# Patient Record
Sex: FEMALE | Race: WHITE | URBAN - METROPOLITAN AREA
[De-identification: names, ages, dates, MRNs, and addresses within clinical notes are randomized per-mention and may not be internally consistent; named-entity substitution may affect disease eponyms.]

---

## 2017-05-30 ENCOUNTER — TRANSFERRED RECORDS (OUTPATIENT)
Dept: HEALTH INFORMATION MANAGEMENT | Facility: CLINIC | Age: 82
End: 2017-05-30

## 2017-06-11 ENCOUNTER — TRANSFERRED RECORDS (OUTPATIENT)
Dept: HEALTH INFORMATION MANAGEMENT | Facility: CLINIC | Age: 82
End: 2017-06-11

## 2017-06-20 ENCOUNTER — TRANSFERRED RECORDS (OUTPATIENT)
Dept: HEALTH INFORMATION MANAGEMENT | Facility: CLINIC | Age: 82
End: 2017-06-20

## 2017-06-20 ENCOUNTER — MEDICAL CORRESPONDENCE (OUTPATIENT)
Dept: HEALTH INFORMATION MANAGEMENT | Facility: CLINIC | Age: 82
End: 2017-06-20

## 2017-06-27 PROCEDURE — 00000346 ZZHCL STATISTIC REVIEW OUTSIDE SLIDES TC 88321: Performed by: OBSTETRICS & GYNECOLOGY

## 2017-06-28 ENCOUNTER — ONCOLOGY VISIT (OUTPATIENT)
Dept: ONCOLOGY | Facility: CLINIC | Age: 82
End: 2017-06-28
Attending: OBSTETRICS & GYNECOLOGY
Payer: COMMERCIAL

## 2017-06-28 VITALS
HEART RATE: 107 BPM | RESPIRATION RATE: 12 BRPM | SYSTOLIC BLOOD PRESSURE: 135 MMHG | HEIGHT: 66 IN | TEMPERATURE: 98.3 F | OXYGEN SATURATION: 94 % | BODY MASS INDEX: 28.01 KG/M2 | DIASTOLIC BLOOD PRESSURE: 71 MMHG | WEIGHT: 174.3 LBS

## 2017-06-28 DIAGNOSIS — Z85.43 PERSONAL HISTORY OF OVARIAN CANCER: Primary | ICD-10-CM

## 2017-06-28 PROCEDURE — 99205 OFFICE O/P NEW HI 60 MIN: CPT | Mod: ZP | Performed by: OBSTETRICS & GYNECOLOGY

## 2017-06-28 PROCEDURE — 99212 OFFICE O/P EST SF 10 MIN: CPT | Mod: ZF

## 2017-06-28 RX ORDER — MULTIPLE VITAMINS W/ MINERALS TAB 9MG-400MCG
1 TAB ORAL EVERY MORNING
COMMUNITY
Start: 2011-11-10

## 2017-06-28 RX ORDER — CHOLECALCIFEROL (VITAMIN D3) 50 MCG
2000 TABLET ORAL EVERY MORNING
COMMUNITY
Start: 2015-08-12

## 2017-06-28 RX ORDER — SPIRONOLACTONE 100 MG/1
100 TABLET, FILM COATED ORAL
COMMUNITY
Start: 2017-06-11 | End: 2017-09-18

## 2017-06-28 RX ORDER — ACETAMINOPHEN 325 MG/1
650 TABLET ORAL PRN
COMMUNITY
Start: 2011-11-10

## 2017-06-28 RX ORDER — PRAVASTATIN SODIUM 40 MG
40 TABLET ORAL AT BEDTIME
COMMUNITY
Start: 2017-01-25

## 2017-06-28 RX ORDER — FUROSEMIDE 40 MG
20 TABLET ORAL EVERY MORNING
COMMUNITY
Start: 2017-06-11

## 2017-06-28 RX ORDER — AMOXICILLIN 500 MG
1200 CAPSULE ORAL EVERY MORNING
COMMUNITY
Start: 2011-11-14

## 2017-06-28 ASSESSMENT — PAIN SCALES - GENERAL: PAINLEVEL: NO PAIN (0)

## 2017-06-28 NOTE — LETTER
2017       RE: Kymberly Miller  [address] [address]     Dear Colleague,    Thank you for referring your patient, Kymberly Miller, to the [facility] CANCER CLINIC. Please see a copy of my visit note below.                            Consult Notes on Referred Patient    Date: 2017       Dr. Lily Trejo  [facility]   [address] [place] [address]       RE: Kymberly Miller  : 1933  SEBASTIAN: 2017    Dear Dr. Lily Trejo:    I had the pleasure of seeing your patient Kymberly Miller here at the Gynecologic Cancer Clinic at the [facility] on 2017.  As you know she is a very pleasant 83 year old woman with a recent diagnosis of adenocarcinma of mullerian origin.  Given these findings she was subsequently sent to the Gynecologic Cancer Clinic for new patient consultation.   Patient had some bloating and was found to have ascites with positive cytology. She is  , never has been on HRT. She has been started on neoadjuvant chemotherapy based on ERIN 7 protocol with Dr. Trejo.    Past Medical History:    Aortic stenosis  CHF  CAD      Past Surgical History:    Cholecystectomy      Health Maintenance:  Health Maintenance Due   Topic Date Due     TETANUS IMMUNIZATION (SYSTEM ASSIGNED)  1951     ADVANCE DIRECTIVE PLANNING Q5 YRS  1951     FALL RISK ASSESSMENT  1998     DEXA SCAN SCREENING (SYSTEM ASSIGNED)  1998     PNEUMOCOCCAL (1 of 2 - PCV13) 1998       No history of abnormal pap smears.      Current Medications:     has a current medication list which includes the following prescription(s): acetaminophen, aspirin, vitamin d, furosemide, multivitamin, therapeutic with minerals, fish oil, pravastatin, spironolactone, and prochlorperazine maleate.       Allergies:     [unfilled]        Social History:     Social History   Substance Use Topics     Smoking status: Never Smoker     Smokeless tobacco: Not on  "file     Alcohol use Not on file       History   Drug Use Not on file           Family History:     Sister with breast cancer in her 50s  Mother with breast cancer in her 70s    Physical Exam:     /71  Pulse 107  Temp 98.3  F (36.8  C) (Oral)  Resp 12  Ht 1.672 m (5' 5.83\")  Wt 79.1 kg (174 lb 4.8 oz)  SpO2 94%  BMI 28.28 kg/m2  Body mass index is 28.28 kg/(m^2).    General Appearance: healthy and alert, no distress     Gastrointestinal:       abdomen soft, distended, non-distended, no organomegaly or masses    Genitourinary: External genitalia and urethral meatus appears normal.  Vagina is smooth without nodularity or masses.  Cervix appears normal and without lesions.  Bimanual exam reveal no masses, nodularity or fullness.  Recto-vaginal exam confirms these findings.      Assessment:    Kymberly Miller is a 83 year old woman with a new diagnosis of adenocarcinma of mullerian origin.     A total of 60 minutes was spent with the patient, 40 minutes of which were spent in counseling the patient and/or treatment planning.    1. Metastatic adenocarcinma of mullerian origin  2. CHF  3. CAD  4. Neoadjuvant chemotherapy  5.  451  6. Albumin 2.5    I had a long discussion with the patient and her family, given her overall health advanced adenocarcinma of mullerian origin, she will most likely be stage SHAY given pleural effusion. She has been started on ERIN 7 protocol with Carboplatin Taxol weekly. She will do three cycles and we will see her back after that with a  and repeat CT abdomen and pelvis.      Thank you for allowing us to participate in the care of your patient.         Sincerely,    José Cole MD, MS    Department of Obstetrics and Gynecology   Division of Gynecologic Oncology   [facility]  Phone: [phone]      CC  Patient Care Team:  Terrell Barry as PCP - General (Family Practice)  Lily Trejo as Referring Physician (Hematology & " Oncology)

## 2017-06-28 NOTE — PROGRESS NOTES
Consult Notes on Referred Patient    Date: 2017       Dr. Lily Trejo  [facility]   [address] [place] [address]       RE: Kymberly Miller  : 1933  SEBASTIAN: 2017    Dear Dr. Lily Trejo:    I had the pleasure of seeing your patient Kymberly Miller here at the Gynecologic Cancer Clinic at the [facility] on 2017.  As you know she is a very pleasant 83 year old woman with a recent diagnosis of adenocarcinma of mullerian origin.  Given these findings she was subsequently sent to the Gynecologic Cancer Clinic for new patient consultation.   Patient had some bloating and was found to have ascites with positive cytology. She is  , never has been on HRT. She has been started on neoadjuvant chemotherapy based on ERIN 7 protocol with Dr. Trejo.    Past Medical History:    Aortic stenosis  CHF  CAD      Past Surgical History:    Cholecystectomy      Health Maintenance:  Health Maintenance Due   Topic Date Due     TETANUS IMMUNIZATION (SYSTEM ASSIGNED)  1951     ADVANCE DIRECTIVE PLANNING Q5 YRS  1951     FALL RISK ASSESSMENT  1998     DEXA SCAN SCREENING (SYSTEM ASSIGNED)  1998     PNEUMOCOCCAL (1 of 2 - PCV13) 1998       No history of abnormal pap smears.      Current Medications:     has a current medication list which includes the following prescription(s): acetaminophen, aspirin, vitamin d, furosemide, multivitamin, therapeutic with minerals, fish oil, pravastatin, spironolactone, and prochlorperazine maleate.       Allergies:     [unfilled]        Social History:     Social History   Substance Use Topics     Smoking status: Never Smoker     Smokeless tobacco: Not on file     Alcohol use Not on file       History   Drug Use Not on file           Family History:     Sister with breast cancer in her 50s  Mother with breast cancer in her 70s    Physical Exam:     /71  Pulse 107  Temp 98.3  " F (36.8  C) (Oral)  Resp 12  Ht 1.672 m (5' 5.83\")  Wt 79.1 kg (174 lb 4.8 oz)  SpO2 94%  BMI 28.28 kg/m2  Body mass index is 28.28 kg/(m^2).    General Appearance: healthy and alert, no distress     Gastrointestinal:       abdomen soft, distended, non-distended, no organomegaly or masses    Genitourinary: External genitalia and urethral meatus appears normal.  Vagina is smooth without nodularity or masses.  Cervix appears normal and without lesions.  Bimanual exam reveal no masses, nodularity or fullness.  Recto-vaginal exam confirms these findings.      Assessment:    Kymberly Miller is a 83 year old woman with a new diagnosis of adenocarcinma of mullerian origin.     A total of 60 minutes was spent with the patient, 40 minutes of which were spent in counseling the patient and/or treatment planning.    1. Metastatic adenocarcinma of mullerian origin  2. CHF  3. CAD  4. Neoadjuvant chemotherapy  5.  451  6. Albumin 2.5    I had a long discussion with the patient and her family, given her overall health advanced adenocarcinma of mullerian origin, she will most likely be stage SHAY given pleural effusion. She has been started on ERIN 7 protocol with Carboplatin Taxol weekly. She will do three cycles and we will see her back after that with a  and repeat CT abdomen and pelvis.      Thank you for allowing us to participate in the care of your patient.         Sincerely,    José Cole MD, MS    Department of Obstetrics and Gynecology   Division of Gynecologic Oncology   [facility]  Phone: [phone]      CC  Patient Care Team:  Terrell Barry as PCP - General (Family Practice)  Carlyn Trejo as Referring Physician (Hematology & Oncology)  CARLYN TREJO    "

## 2017-06-28 NOTE — MR AVS SNAPSHOT
After Visit Summary   6/28/2017    Kymberly Miller    MRN: [id number]           Patient Information     Date Of Birth          9/30/1933        Visit Information        Provider Department      6/28/2017 4:00 PM José Cole MD [facility]        Today's Diagnoses     Personal history of ovarian cancer    -  1       Follow-ups after your visit        Your next 10 appointments already scheduled     Sep 18, 2017  7:00 AM CDT   (Arrive by 6:45 AM)   CT CHEST ABDOMEN PELVIS W/O & W CONTRAST with UCCT1   [facility] [place] [facility] ([facility])    [address] [address] [phone]           Please bring any scans or X-rays taken at other hospitals, if similar tests were done. Also bring a list of your medicines, including vitamins, minerals and over-the-counter drugs. It is safest to leave personal items at home.  Be sure to tell your doctor:   If you have any allergies.   If there s any chance you are pregnant.   If you are breastfeeding.   If you have any special needs.  You may have contrast for this exam. To prepare:   Do not eat or drink for 2 hours before your exam. If you need to take medicine, you may take it with small sips of water. (We may ask you to take liquid medicine as well.)   The day before your exam, drink extra fluids at least six 8-ounce glasses (unless your doctor tells you to restrict your fluids).  Patients over 70 or patients with diabetes or kidney problems:   If you haven t had a blood test (creatinine test) within the last 30 days, go to your clinic or Diagnostic Imaging Department for this test.  If you have diabetes:   If your kidney function is normal, continue taking your metformin (Avandamet, Glucophage, Glucovance, Metaglip) on the day of your exam.   If your kidney function is abnormal, wait 48 hours before restarting this medicine.  You will have oral contrast for this exam:   You will  drink the contrast at home. Get this from your clinic or Diagnostic Imaging Department. Please follow the directions given.  Please wear loose clothing, such as a sweat suit or jogging clothes. Avoid snaps, zippers and other metal. We may ask you to undress and put on a hospital gown.  If you have any questions, please call the Imaging Department where you will have your exam.            Sep 18, 2017  7:30 AM CDT   [facility] Lab Draw with  [facility] LAB DRAW   [facility] ([facility])    [address] [place] [id number]   [phone]            Sep 18, 2017  2:40 PM CDT   (Arrive by 2:25 PM)   Return Visit with José Cole MD   [facility] ([facility])    [address] [place] [address]   [phone]            Sep 18, 2017  4:00 PM CDT   (Arrive by 3:45 PM)   PAC EVALUATION with  Pac Catia [address] [place] [address] Preoperative Assessment [place] ([facility])    [address] [place] [id number]   [phone]            Sep 18, 2017  4:50 PM CDT   (Arrive by 4:35 PM)   PAC Anesthesia Consult with  Pac Anesthesiologist   [facility] Preoperative Assessment [place] ([facility])    [address] [place] [address]   [phone]            Sep 18, 2017  5:00 PM CDT   (Arrive by 4:45 PM)   PAC RN ASSESSMENT with  Pac Rn   [facility] Preoperative Assessment [place] ([facility])    [address] [place] [id number]   [phone]              Future tests that were ordered for you today     Open Future Orders        Priority Expected Expires Ordered     Routine  8/17/2018 8/17/2017    CT Chest Abdomen Pelvis w/o & w Contrast Routine  8/17/2018 8/17/2017            Who to contact     If you have questions or need follow up information about  "today's clinic visit or your schedule please contact [facility] CANCER [facility] directly at [phone].  Normal or non-critical lab and imaging results will be communicated to you by [employer], letter or phone within 4 business days after the clinic has received the results. If you do not hear from us within 7 days, please contact the clinic through [employer] or phone. If you have a critical or abnormal lab result, we will notify you by phone as soon as possible.  Submit refill requests through [employer] or call your pharmacy and they will forward the refill request to us. Please allow 3 business days for your refill to be completed.          Additional Information About Your Visit        [employer] Information     [employer] lets you send messages to your doctor, view your test results, renew your prescriptions, schedule appointments and more. To sign up, go to [email or web address] . Click on \"Log in\" on the left side of the screen, which will take you to the Welcome page. Then click on \"Sign up Now\" on the right side of the page.     You will be asked to enter the access code listed below, as well as some personal information. Please follow the directions to create your username and password.     Your access code is: [email or web address]  Expires: 2017  7:19 AM     Your access code will  in 90 days. If you need help or a new code, please call your [facility] clinic or [phone].        Care EveryWhere ID     This is your Care EveryWhere ID. This could be used by other organizations to access your [facility] medical records  [id number]        Your Vitals Were     Pulse Temperature Respirations Height Pulse Oximetry BMI (Body Mass Index)    107 98.3  F (36.8  C) (Oral) 12 1.672 m (5' 5.83\") 94% 28.28 kg/m2       Blood Pressure from Last 3 Encounters:   17 135/71    Weight from Last 3 Encounters:   17 79.1 kg (174 lb 4.8 oz)              Today, you had the following     No orders found for display       " Primary Care Provider Office Phone # Fax #    Terrell Barry [phone]       [facility] [address]  [facility] [address]        Equal Access to Services     MARCY MUSA : Hadii aad ku hadphilipibsmark Anneliseroya, wadavidda luqadaha, qaybta kaalmada kaushik, peter osirisin hayaan peramanda billmargretraji lea. So [facility] [phone].    ATENCIÓN: Si habla español, tiene a lozano disposición servicios gratuitos de asistencia lingüística. Llame al [phone].    We comply with applicable federal civil rights laws and [place] laws. We do not discriminate on the basis of race, color, national origin, age, disability sex, sexual orientation or gender identity.            Thank you!     Thank you for choosing [facility] CANCER [facility]  for your care. Our goal is always to provide you with excellent care. Hearing back from our patients is one way we can continue to improve our services. Please take a few minutes to complete the written survey that you may receive in the mail after your visit with us. Thank you!             Your Updated Medication List - Protect others around you: Learn how to safely use, store and throw away your medicines at www.disposemymeds.org.          This list is accurate as of: 6/28/17 11:59 PM.  Always use your most recent med list.                   Brand Name Dispense Instructions for use Diagnosis    acetaminophen 325 MG tablet    TYLENOL     Take 650 mg by mouth        aspirin 81 MG EC tablet      Take 81 mg by mouth        Fish Oil 1200 MG Caps      Take 1,200 mg by mouth        furosemide 40 MG tablet    LASIX     Take 40 mg by mouth        Multi-vitamin Tabs tablet           pravastatin 40 MG tablet    PRAVACHOL     Take 40 mg by mouth        PROCHLORPERAZINE MALEATE PO      Take 10 mg by mouth        spironolactone 100 MG tablet    ALDACTONE     Take 100 mg by mouth        vitamin D 2000 UNITS tablet      Take 2,000 Units by mouth

## 2017-06-28 NOTE — NURSING NOTE
"Oncology Rooming Note    June 28, 2017 3:26 PM   Kymberly Miller is a 83 year old female who presents for:    Chief Complaint   Patient presents with     Oncology Clinic Visit     ovarian ca      Initial Vitals: /71  Pulse 107  Temp 98.3  F (36.8  C) (Oral)  Resp 12  Ht 1.672 m (5' 5.83\")  Wt 79.1 kg (174 lb 4.8 oz)  SpO2 94%  BMI 28.28 kg/m2 Estimated body mass index is 28.28 kg/(m^2) as calculated from the following:    Height as of this encounter: 1.672 m (5' 5.83\").    Weight as of this encounter: 79.1 kg (174 lb 4.8 oz). Body surface area is 1.92 meters squared.  No Pain (0) Comment: Data Unavailable   No LMP recorded. Patient is not currently having periods (Reason: UNKNOWN).  Allergies reviewed: Yes  Medications reviewed: Yes    Medications: Medication refills not needed today.  Pharmacy name entered into EPIC: Data Unavailable    Clinical concerns: no doc was NOT notified.    5 minutes for nursing intake (face to face time)     Yusef Leong CMA              "

## 2017-06-30 LAB — COPATH REPORT: NORMAL

## 2017-08-17 DIAGNOSIS — Z85.43 PERSONAL HISTORY OF OVARIAN CANCER: Primary | ICD-10-CM

## 2017-08-17 DIAGNOSIS — C56.9 MALIGNANT NEOPLASM OF OVARY (H): Primary | ICD-10-CM

## 2017-09-15 ENCOUNTER — TRANSFERRED RECORDS (OUTPATIENT)
Dept: HEALTH INFORMATION MANAGEMENT | Facility: CLINIC | Age: 82
End: 2017-09-15

## 2017-09-18 ENCOUNTER — ONCOLOGY VISIT (OUTPATIENT)
Dept: ONCOLOGY | Facility: CLINIC | Age: 82
End: 2017-09-18
Attending: OBSTETRICS & GYNECOLOGY
Payer: MEDICARE

## 2017-09-18 ENCOUNTER — APPOINTMENT (OUTPATIENT)
Dept: LAB | Facility: CLINIC | Age: 82
End: 2017-09-18
Attending: OBSTETRICS & GYNECOLOGY
Payer: MEDICARE

## 2017-09-18 VITALS
SYSTOLIC BLOOD PRESSURE: 134 MMHG | BODY MASS INDEX: 26.89 KG/M2 | DIASTOLIC BLOOD PRESSURE: 87 MMHG | HEIGHT: 66 IN | WEIGHT: 167.3 LBS | TEMPERATURE: 98.2 F | OXYGEN SATURATION: 99 % | HEART RATE: 87 BPM | RESPIRATION RATE: 16 BRPM

## 2017-09-18 DIAGNOSIS — Z85.43 PERSONAL HISTORY OF OVARIAN CANCER: ICD-10-CM

## 2017-09-18 PROCEDURE — 99214 OFFICE O/P EST MOD 30 MIN: CPT | Mod: ZP | Performed by: OBSTETRICS & GYNECOLOGY

## 2017-09-18 PROCEDURE — 99212 OFFICE O/P EST SF 10 MIN: CPT | Mod: ZF

## 2017-09-18 PROCEDURE — 36591 DRAW BLOOD OFF VENOUS DEVICE: CPT

## 2017-09-18 PROCEDURE — 86304 IMMUNOASSAY TUMOR CA 125: CPT | Performed by: OBSTETRICS & GYNECOLOGY

## 2017-09-18 PROCEDURE — 25000128 H RX IP 250 OP 636: Mod: ZF | Performed by: OBSTETRICS & GYNECOLOGY

## 2017-09-18 RX ORDER — HEPARIN SODIUM (PORCINE) LOCK FLUSH IV SOLN 100 UNIT/ML 100 UNIT/ML
5 SOLUTION INTRAVENOUS DAILY PRN
Status: DISCONTINUED | OUTPATIENT
Start: 2017-09-18 | End: 2017-09-22 | Stop reason: HOSPADM

## 2017-09-18 RX ORDER — LIDOCAINE/PRILOCAINE 2.5 %-2.5%
CREAM (GRAM) TOPICAL PRN
COMMUNITY

## 2017-09-18 RX ADMIN — SODIUM CHLORIDE, PRESERVATIVE FREE 5 ML: 5 INJECTION INTRAVENOUS at 07:36

## 2017-09-18 ASSESSMENT — PAIN SCALES - GENERAL: PAINLEVEL: NO PAIN (0)

## 2017-09-18 NOTE — LETTER
2017       RE: Kymberly Miller  [address] [address]     Dear Colleague,    Thank you for referring your patient, Kymberly Miller, to the [facility] CANCER CLINIC. Please see a copy of my visit note below.                Follow Up Notes on Referred Patient    Date: 2017       Dr. Terrell Barry  [facility]  [address]  [place] [address]       RE: Kymberly Miller  : 1933  SEBASTIAN: 2017    Dear Dr. 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   The patient presents today here for followup.  She has received 3 cycles of ERIN-7 based treatment with weekly carboplatin and paclitaxel.  She has done well, normalized her CA-125.  She is eating and drinking better, minimal nausea, no vomiting.  No signs of infection, normal urinary and bowel function.           Past Medical History:    Past Medical History:   Diagnosis Date     Ascites      Chronic diastolic heart failure (H)      Hyperlipidemia      Mild aortic stenosis      Moderate mitral regurgitation      Nonobstructive atherosclerosis of coronary artery      Obesity      Ovarian cancer (H)      Pacemaker     [employer]ator Model # [id number] Serial # [id number] Implant date 2016 Indication 3RD DEGREE BLOCK     Third degree heart block (H)          Past Surgical History:    Past Surgical History:   Procedure Laterality Date     CATARACT IOL, RT/LT       CHOLECYSTECTOMY       HC LEFT HEART CATHETERIZATION       IMPLANT PACEMAKER  2016     OTHER SURGICAL HISTORY  2017    power port placement         Health Maintenance Due   Topic Date Due     TETANUS IMMUNIZATION (SYSTEM ASSIGNED)  1951     ADVANCE DIRECTIVE PLANNING Q5 YRS  1988     FALL RISK ASSESSMENT  1998     DEXA SCAN SCREENING (SYSTEM ASSIGNED)  1998     PNEUMOCOCCAL (1 of 2 - PCV13) 1998     INFLUENZA VACCINE (SYSTEM ASSIGNED)   "09/01/2017       Current Medications:     Current Outpatient Prescriptions   Medication Sig Dispense Refill     POTASSIUM CHLORIDE PO Take 10 mEq by mouth daily       lidocaine-prilocaine (EMLA) cream Apply topically as needed for moderate pain       aspirin 81 MG EC tablet Take 81 mg by mouth Takes three times a week       Cholecalciferol (VITAMIN D) 2000 UNITS tablet Take 2,000 Units by mouth daily        furosemide (LASIX) 40 MG tablet Take 40 mg by mouth daily        multivitamin, therapeutic with minerals (MULTI-VITAMIN) TABS tablet Take by mouth daily        Omega-3 Fatty Acids (FISH OIL) 1200 MG CAPS Take 1,200 mg by mouth daily        pravastatin (PRAVACHOL) 40 MG tablet Take 40 mg by mouth daily        iohexol (OMNIPAQUE) 140 MG/ML SOLN solution Mix entire bottle (50ml) of contast with 600ml (20 ounces) of water and drink half 2 hrs prior to CT scan and half 1 hr prior to scan (Patient not taking: Reported on 9/18/2017) 50 mL 0     acetaminophen (TYLENOL) 325 MG tablet Take 650 mg by mouth as needed        PROCHLORPERAZINE MALEATE PO Take 10 mg by mouth           Allergies:        Allergies   Allergen Reactions     Atorvastatin      Muscle pain         Social History:     Social History   Substance Use Topics     Smoking status: Never Smoker     Smokeless tobacco: Never Used     Alcohol use 1.2 - 1.8 oz/week     2 - 3 Cans of beer per week       History   Drug Use Not on file         Family History:       Family History   Problem Relation Age of Onset     CEREBROVASCULAR DISEASE Mother      HEART DISEASE Father      Chronic Obstructive Pulmonary Disease Sister      Esophageal Cancer Brother 41     CEREBROVASCULAR DISEASE Brother 80     Heart Failure Son      Pacemaker Son          Physical Exam:     /87  Pulse 87  Temp 98.2  F (36.8  C) (Oral)  Resp 16  Ht 1.672 m (5' 5.83\")  Wt 75.9 kg (167 lb 4.8 oz)  SpO2 99%  Breastfeeding? No  BMI 27.14 kg/m2  Body mass index is 27.14 kg/(m^2).    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     A total of 30 minutes was spent with the patient,  minutes of which were spent in counseling the patient and/or treatment planning.    1.  Stage IV high-grade serous ovarian carcinoma.   2.  Positive treatment response.   3.  Several 3 mm lung nodules.      I had a long discussion with the patient as well as with her family.  We plan to proceed with another 3 cycles of chemotherapy based on ERIN-7 protocol with carboplatin weekly of AUC of 2 as well as paclitaxel dose of 60 mg/m2 weekly.  We will see her back after that with another CT scan as well as a CA-125.  Depending on the lung nodules, if those have disappeared, we will proceed with surgery at that point.  If they are unchanged, they might not be disease related.  If she has further positive treatment response, we will then proceed with debulking surgery.  The patient as well as family agree with the plan, are very appreciative of her care.  All questions were answered.       José Cole MD, MS    Department of Obstetrics and Gynecology   Division of Gynecologic Oncology   [facility]  Phone: [phone]    CC  Patient Care Team:  Terrell Barry as PCP - General (Family Practice)  Lily Trejo as Referring Physician (Hematology & Oncology)      Patient presents today here for      Reviewed by MD. Patient will continue on chemotherapy.

## 2017-09-18 NOTE — MR AVS SNAPSHOT
After Visit Summary   9/18/2017    Kymberly Miller    MRN: [id number]           Patient Information     Date Of Birth          9/30/1933        Visit Information        Provider Department      9/18/2017 2:40 PM José Cole MD [facility]        Today's Diagnoses     Personal history of ovarian cancer           Follow-ups after your visit        Your next 10 appointments already scheduled     Dec 11, 2017  7:20 AM CST   (Arrive by 7:05 AM)   CT CHEST ABDOMEN PELVIS W/O & W CONTRAST with UCCT2   [facility] ([facility])    [address] [address] [phone]           Please bring any scans or X-rays taken at other hospitals, if similar tests were done. Also bring a list of your medicines, including vitamins, minerals and over-the-counter drugs. It is safest to leave personal items at home.  Be sure to tell your doctor:   If you have any allergies.   If there s any chance you are pregnant.   If you are breastfeeding.   If you have any special needs.  You may have contrast for this exam. To prepare:   Do not eat or drink for 2 hours before your exam. If you need to take medicine, you may take it with small sips of water. (We may ask you to take liquid medicine as well.)   The day before your exam, drink extra fluids at least six 8-ounce glasses (unless your doctor tells you to restrict your fluids).  Patients over 70 or patients with diabetes or kidney problems:   If you haven t had a blood test (creatinine test) within the last 30 days, go to your clinic or Diagnostic Imaging Department for this test.  If you have diabetes:   If your kidney function is normal, continue taking your metformin (Avandamet, Glucophage, Glucovance, Metaglip) on the day of your exam.   If your kidney function is abnormal, wait 48 hours before restarting this medicine.  You will have oral contrast for this exam:   You will  drink the contrast at home. Get this from your clinic or Diagnostic Imaging Department. Please follow the directions given.  Please wear loose clothing, such as a sweat suit or jogging clothes. Avoid snaps, zippers and other metal. We may ask you to undress and put on a hospital gown.  If you have any questions, please call the Imaging Department where you will have your exam.            Dec 11, 2017  2:40 PM CST   (Arrive by 2:25 PM)   Return Visit with José Cole MD   [facility] ([facility])    [address] [place] [id number] [phone]            Dec 11, 2017  4:30 PM CST   (Arrive by 4:15 PM)   PAC EVALUATION with  Pac Catia 9   [facility] Preoperative Assessment [place] ([facility])    [address] [place] [id number] [phone]            Dec 11, 2017  5:00 PM CST   (Arrive by 4:45 PM)   PAC RN ASSESSMENT with  Pac Rn   [facility] Preoperative Assessment [place] ([facility])    [address] [place] [id number] [phone]            Dec 11, 2017  5:30 PM CST   [facility] Draw with  [facility] DRAW   [facility] ([facility])    [address] [place] [id number] [phone]              Who to contact     If you have questions or need follow up information about today's clinic visit or your schedule please contact [facility] directly at [phone].  Normal or non-critical lab and imaging results will be communicated to you by MyChart, letter or phone within 4 business days after the clinic has received the results. If you do not hear from us within 7 days, please contact the clinic through MyChart or phone. If you have a critical or abnormal lab result, we will notify you by phone as soon as possible.  Submit refill requests through  "MyChart or call your pharmacy and they will forward the refill request to us. Please allow 3 business days for your refill to be completed.          Additional Information About Your Visit        MyChart Information     [employer] lets you send messages to your doctor, view your test results, renew your prescriptions, schedule appointments and more. To sign up, go to [email or web address] . Click on \"Log in\" on the left side of the screen, which will take you to the Welcome page. Then click on \"Sign up Now\" on the right side of the page.     You will be asked to enter the access code listed below, as well as some personal information. Please follow the directions to create your username and password.     Your access code is: [email or web address]  Expires: 2017 11:05 AM     Your access code will  in 90 days. If you need help or a new code, please call your [facility] clinic or [phone].        Care EveryWhere ID     This is your [facility] EveryWhere ID. This could be used by other organizations to access your [facility] medical records  [id number]        Your Vitals Were     Pulse Temperature Respirations Height Pulse Oximetry Breastfeeding?    87 98.2  F (36.8  C) (Oral) 16 1.672 m (5' 5.83\") 99% No    BMI (Body Mass Index)                   27.14 kg/m2            Blood Pressure from Last 3 Encounters:   17 134/87   17 135/71    Weight from Last 3 Encounters:   17 75.9 kg (167 lb 4.8 oz)   17 79.1 kg (174 lb 4.8 oz)              We Performed the Following             Primary Care Provider Office Phone # Fax #    Terrell Barry [phone]       [facility] [address]  [facility] [address]        Equal Access to Services     MARCY MUSA : Brianna Ayala, wageoffrey stanford, qaybta kaalsalvatore faulkner, peter lea. So [facility] [phone].    ATENCIÓN: Si habla español, tiene a lozano disposición servicios gratuitos de " asistencia lingüística. Ramos al [phone].    We comply with applicable federal civil rights laws and [place] laws. We do not discriminate on the basis of race, color, national origin, age, disability, sex, sexual orientation, or gender identity.            Thank you!     Thank you for choosing [facility]  for your care. Our goal is always to provide you with excellent care. Hearing back from our patients is one way we can continue to improve our services. Please take a few minutes to complete the written survey that you may receive in the mail after your visit with us. Thank you!             Your Updated Medication List - Protect others around you: Learn how to safely use, store and throw away your medicines at www.disposemymeds.org.          This list is accurate as of: 9/18/17 11:59 PM.  Always use your most recent med list.                   Brand Name Dispense Instructions for use Diagnosis    acetaminophen 325 MG tablet    TYLENOL     Take 650 mg by mouth as needed        aspirin 81 MG EC tablet      Take 81 mg by mouth Takes three times a week        fish Oil 1200 MG capsule      Take 1,200 mg by mouth daily        furosemide 40 MG tablet    LASIX     Take 40 mg by mouth daily        iohexol 140 MG/ML Soln solution    OMNIPAQUE    50 mL    Mix entire bottle (50ml) of contast with 600ml (20 ounces) of water and drink half 2 hrs prior to CT scan and half 1 hr prior to scan    Malignant neoplasm of ovary (H)       lidocaine-prilocaine cream    EMLA     Apply topically as needed for moderate pain        Multi-vitamin Tabs tablet      Take by mouth daily        POTASSIUM CHLORIDE PO      Take 10 mEq by mouth daily        pravastatin 40 MG tablet    PRAVACHOL     Take 40 mg by mouth daily        PROCHLORPERAZINE MALEATE PO      Take 10 mg by mouth        vitamin D 2000 UNITS tablet      Take 2,000 Units by mouth daily

## 2017-09-18 NOTE — NURSING NOTE
"Oncology Rooming Note    September 18, 2017 2:48 PM   Kymberly Miller is a 83 year old female who presents for:    Chief Complaint   Patient presents with     Port Draw     pt appts all later today in afternoon-instructed pt to bring back wb and labels to pt-port accessed and de-accessed after lab collection-weight only for vitals-paging providerfor additional orders for JIC vials blue, green and purple that was collected and sent to lab for processing     Oncology Clinic Visit     return patient visit for scab results/surgery consent related to  Malignant neoplasm of ovary (H)      Initial Vitals: /87  Pulse 87  Temp 98.2  F (36.8  C) (Oral)  Resp 16  Ht 1.672 m (5' 5.83\")  Wt 75.9 kg (167 lb 4.8 oz)  SpO2 99%  Breastfeeding? No  BMI 27.14 kg/m2 Estimated body mass index is 27.14 kg/(m^2) as calculated from the following:    Height as of this encounter: 1.672 m (5' 5.83\").    Weight as of this encounter: 75.9 kg (167 lb 4.8 oz). Body surface area is 1.88 meters squared.  No Pain (0) Comment: Data Unavailable   No LMP recorded. Patient is not currently having periods (Reason: UNKNOWN).  Allergies reviewed: Yes  Medications reviewed: Yes    Medications: Medication refills not needed today.  Pharmacy name entered into EPIC: Data Unavailable    Clinical concerns: no concerns dr. fonseca was notified.    5 minutes for nursing intake (face to face time)     Delmy Carrillo CMA              "

## 2017-09-18 NOTE — NURSING NOTE
Chief Complaint   Patient presents with     Port Draw     pt appts all later today in afternoon-instructed pt to bring back wb and labels to pt-port accessed and de-accessed after lab collection-weight only for vitals-paging providerfor additional orders for JIC vials blue, green and purple that was collected and sent to lab for processing   Pt tolerated port accession well.    Susanne Gambino

## 2017-09-19 LAB — CANCER AG125 SERPL-ACNC: 12 U/ML (ref 0–30)

## 2017-09-21 NOTE — PROGRESS NOTES
Follow Up Notes on Referred Patient    Date: 2017       Dr. Terrell Barry  [facility]  [address]  [place] [address]       RE: Kymberly Miller  : 1933  SEBASTIAN: 2017    Dear Dr. 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   The patient presents today here for followup.  She has received 3 cycles of ERIN-7 based treatment with weekly carboplatin and paclitaxel.  She has done well, normalized her CA-125.  She is eating and drinking better, minimal nausea, no vomiting.  No signs of infection, normal urinary and bowel function.           Past Medical History:    Past Medical History:   Diagnosis Date     Ascites      Chronic diastolic heart failure (H)      Hyperlipidemia      Mild aortic stenosis      Moderate mitral regurgitation      Nonobstructive atherosclerosis of coronary artery      Obesity      Ovarian cancer (H)      Pacemaker     [employer] Generator Model # [id number] Serial # [id number] Implant date 2016 Indication 3RD DEGREE BLOCK     Third degree heart block (H)          Past Surgical History:    Past Surgical History:   Procedure Laterality Date     CATARACT IOL, RT/LT       CHOLECYSTECTOMY       HC LEFT HEART CATHETERIZATION  2015     IMPLANT PACEMAKER  2016     OTHER SURGICAL HISTORY  2017    power port placement         Health Maintenance Due   Topic Date Due     TETANUS IMMUNIZATION (SYSTEM ASSIGNED)  1951     ADVANCE DIRECTIVE PLANNING Q5 YRS  1988     FALL RISK ASSESSMENT  1998     DEXA SCAN SCREENING (SYSTEM ASSIGNED)  1998     PNEUMOCOCCAL (1 of 2 - PCV13) 1998     INFLUENZA VACCINE (SYSTEM ASSIGNED)  2017       Current Medications:     Current Outpatient Prescriptions   Medication Sig Dispense Refill     POTASSIUM CHLORIDE PO Take 10 mEq by mouth daily       lidocaine-prilocaine (EMLA) cream Apply topically as needed for  "moderate pain       aspirin 81 MG EC tablet Take 81 mg by mouth Takes three times a week       Cholecalciferol (VITAMIN D) 2000 UNITS tablet Take 2,000 Units by mouth daily        furosemide (LASIX) 40 MG tablet Take 40 mg by mouth daily        multivitamin, therapeutic with minerals (MULTI-VITAMIN) TABS tablet Take by mouth daily        Omega-3 Fatty Acids (FISH OIL) 1200 MG CAPS Take 1,200 mg by mouth daily        pravastatin (PRAVACHOL) 40 MG tablet Take 40 mg by mouth daily        iohexol (OMNIPAQUE) 140 MG/ML SOLN solution Mix entire bottle (50ml) of contast with 600ml (20 ounces) of water and drink half 2 hrs prior to CT scan and half 1 hr prior to scan (Patient not taking: Reported on 9/18/2017) 50 mL 0     acetaminophen (TYLENOL) 325 MG tablet Take 650 mg by mouth as needed        PROCHLORPERAZINE MALEATE PO Take 10 mg by mouth           Allergies:        Allergies   Allergen Reactions     Atorvastatin      Muscle pain         Social History:     Social History   Substance Use Topics     Smoking status: Never Smoker     Smokeless tobacco: Never Used     Alcohol use 1.2 - 1.8 oz/week     2 - 3 Cans of beer per week       History   Drug Use Not on file         Family History:       Family History   Problem Relation Age of Onset     CEREBROVASCULAR DISEASE Mother      HEART DISEASE Father      Chronic Obstructive Pulmonary Disease Sister      Esophageal Cancer Brother 41     CEREBROVASCULAR DISEASE Brother 80     Heart Failure Son      Pacemaker Son          Physical Exam:     /87  Pulse 87  Temp 98.2  F (36.8  C) (Oral)  Resp 16  Ht 1.672 m (5' 5.83\")  Wt 75.9 kg (167 lb 4.8 oz)  SpO2 99%  Breastfeeding? No  BMI 27.14 kg/m2  Body mass index is 27.14 kg/(m^2).    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     A total of 30 minutes was spent with the patient,  minutes " of which were spent in counseling the patient and/or treatment planning.    1.  Stage IV high-grade serous ovarian carcinoma.   2.  Positive treatment response.   3.  Several 3 mm lung nodules.      I had a long discussion with the patient as well as with her family.  We plan to proceed with another 3 cycles of chemotherapy based on ERIN-7 protocol with carboplatin weekly of AUC of 2 as well as paclitaxel dose of 60 mg/m2 weekly.  We will see her back after that with another CT scan as well as a CA-125.  Depending on the lung nodules, if those have disappeared, we will proceed with surgery at that point.  If they are unchanged, they might not be disease related.  If she has further positive treatment response, we will then proceed with debulking surgery.  The patient as well as family agree with the plan, are very appreciative of her care.  All questions were answered.       José Cole MD, MS    Department of Obstetrics and Gynecology   Division of Gynecologic Oncology   [facility]  Phone: [phone]    CC  Patient Care Team:  Nadege Nichole as PCP - General (Family Practice)  Lily Trejo as Referring Physician (Hematology & Oncology)  NADEGE NICHOLE

## 2017-11-10 DIAGNOSIS — Z85.43 PERSONAL HISTORY OF OVARIAN CANCER: Primary | ICD-10-CM

## 2017-12-11 ENCOUNTER — ANESTHESIA EVENT (OUTPATIENT)
Dept: SURGERY | Facility: CLINIC | Age: 82
End: 2017-12-11

## 2017-12-11 ENCOUNTER — OFFICE VISIT (OUTPATIENT)
Dept: SURGERY | Facility: CLINIC | Age: 82
End: 2017-12-11

## 2017-12-11 ENCOUNTER — APPOINTMENT (OUTPATIENT)
Dept: LAB | Facility: CLINIC | Age: 82
End: 2017-12-11
Attending: OBSTETRICS & GYNECOLOGY
Payer: MEDICARE

## 2017-12-11 ENCOUNTER — ALLIED HEALTH/NURSE VISIT (OUTPATIENT)
Dept: CARDIOLOGY | Facility: CLINIC | Age: 82
End: 2017-12-11
Attending: INTERNAL MEDICINE
Payer: MEDICARE

## 2017-12-11 ENCOUNTER — ALLIED HEALTH/NURSE VISIT (OUTPATIENT)
Dept: SURGERY | Facility: CLINIC | Age: 82
End: 2017-12-11

## 2017-12-11 ENCOUNTER — ONCOLOGY VISIT (OUTPATIENT)
Dept: ONCOLOGY | Facility: CLINIC | Age: 82
End: 2017-12-11
Attending: OBSTETRICS & GYNECOLOGY
Payer: MEDICARE

## 2017-12-11 VITALS
BODY MASS INDEX: 29.42 KG/M2 | SYSTOLIC BLOOD PRESSURE: 135 MMHG | RESPIRATION RATE: 18 BRPM | TEMPERATURE: 98.3 F | HEART RATE: 105 BPM | DIASTOLIC BLOOD PRESSURE: 63 MMHG | HEIGHT: 65 IN | OXYGEN SATURATION: 97 % | WEIGHT: 176.6 LBS

## 2017-12-11 VITALS
TEMPERATURE: 98.2 F | HEART RATE: 88 BPM | DIASTOLIC BLOOD PRESSURE: 72 MMHG | BODY MASS INDEX: 28.32 KG/M2 | OXYGEN SATURATION: 98 % | WEIGHT: 176.2 LBS | HEIGHT: 66 IN | SYSTOLIC BLOOD PRESSURE: 129 MMHG | RESPIRATION RATE: 16 BRPM

## 2017-12-11 DIAGNOSIS — N94.89 OTHER SPECIFIED CONDITIONS ASSOCIATED WITH FEMALE GENITAL ORGANS AND MENSTRUAL CYCLE: ICD-10-CM

## 2017-12-11 DIAGNOSIS — Z01.818 PREOPERATIVE EXAMINATION: ICD-10-CM

## 2017-12-11 DIAGNOSIS — Z01.818 PREOP EXAMINATION: Primary | ICD-10-CM

## 2017-12-11 DIAGNOSIS — Z01.818 PREOPERATIVE EXAMINATION: Primary | ICD-10-CM

## 2017-12-11 DIAGNOSIS — I44.2 ATRIOVENTRICULAR BLOCK, COMPLETE (H): Primary | ICD-10-CM

## 2017-12-11 DIAGNOSIS — C56.9 MALIGNANT NEOPLASM OF OVARY, UNSPECIFIED LATERALITY (H): ICD-10-CM

## 2017-12-11 LAB
ALBUMIN SERPL-MCNC: 3.6 G/DL (ref 3.4–5)
ALP SERPL-CCNC: 83 U/L (ref 40–150)
ALT SERPL W P-5'-P-CCNC: 24 U/L (ref 0–50)
ANION GAP SERPL CALCULATED.3IONS-SCNC: 7 MMOL/L (ref 3–14)
AST SERPL W P-5'-P-CCNC: 21 U/L (ref 0–45)
BASOPHILS # BLD AUTO: 0 10E9/L (ref 0–0.2)
BASOPHILS NFR BLD AUTO: 0.6 %
BILIRUB SERPL-MCNC: 0.3 MG/DL (ref 0.2–1.3)
BUN SERPL-MCNC: 15 MG/DL (ref 7–30)
CALCIUM SERPL-MCNC: 9.1 MG/DL (ref 8.5–10.1)
CHLORIDE SERPL-SCNC: 103 MMOL/L (ref 94–109)
CO2 SERPL-SCNC: 30 MMOL/L (ref 20–32)
CREAT BLD-MCNC: 0.5 MG/DL (ref 0.52–1.04)
CREAT SERPL-MCNC: 0.5 MG/DL (ref 0.52–1.04)
DIFFERENTIAL METHOD BLD: ABNORMAL
EOSINOPHIL # BLD AUTO: 0.1 10E9/L (ref 0–0.7)
EOSINOPHIL NFR BLD AUTO: 0.9 %
ERYTHROCYTE [DISTWIDTH] IN BLOOD BY AUTOMATED COUNT: 14.1 % (ref 10–15)
GFR SERPL CREATININE-BSD FRML MDRD: >90 ML/MIN/1.7M2
GFR SERPL CREATININE-BSD FRML MDRD: >90 ML/MIN/1.7M2
GLUCOSE SERPL-MCNC: 96 MG/DL (ref 70–99)
HCT VFR BLD AUTO: 38.1 % (ref 35–47)
HGB BLD-MCNC: 12.6 G/DL (ref 11.7–15.7)
IMM GRANULOCYTES # BLD: 0 10E9/L (ref 0–0.4)
IMM GRANULOCYTES NFR BLD: 0.6 %
LYMPHOCYTES # BLD AUTO: 2.5 10E9/L (ref 0.8–5.3)
LYMPHOCYTES NFR BLD AUTO: 35.3 %
MCH RBC QN AUTO: 34.1 PG (ref 26.5–33)
MCHC RBC AUTO-ENTMCNC: 33.1 G/DL (ref 31.5–36.5)
MCV RBC AUTO: 103 FL (ref 78–100)
MONOCYTES # BLD AUTO: 0.8 10E9/L (ref 0–1.3)
MONOCYTES NFR BLD AUTO: 12.1 %
NEUTROPHILS # BLD AUTO: 3.5 10E9/L (ref 1.6–8.3)
NEUTROPHILS NFR BLD AUTO: 50.5 %
NRBC # BLD AUTO: 0 10*3/UL
NRBC BLD AUTO-RTO: 0 /100
PLATELET # BLD AUTO: 189 10E9/L (ref 150–450)
POTASSIUM SERPL-SCNC: 3.8 MMOL/L (ref 3.4–5.3)
PROT SERPL-MCNC: 7.7 G/DL (ref 6.8–8.8)
RBC # BLD AUTO: 3.69 10E12/L (ref 3.8–5.2)
SODIUM SERPL-SCNC: 139 MMOL/L (ref 133–144)
WBC # BLD AUTO: 7 10E9/L (ref 4–11)

## 2017-12-11 PROCEDURE — 93280 PM DEVICE PROGR EVAL DUAL: CPT | Mod: 26 | Performed by: INTERNAL MEDICINE

## 2017-12-11 PROCEDURE — 36415 COLL VENOUS BLD VENIPUNCTURE: CPT | Performed by: OBSTETRICS & GYNECOLOGY

## 2017-12-11 PROCEDURE — 82565 ASSAY OF CREATININE: CPT

## 2017-12-11 PROCEDURE — 93280 PM DEVICE PROGR EVAL DUAL: CPT

## 2017-12-11 PROCEDURE — 99212 OFFICE O/P EST SF 10 MIN: CPT | Mod: ZF

## 2017-12-11 PROCEDURE — 99214 OFFICE O/P EST MOD 30 MIN: CPT | Performed by: OBSTETRICS & GYNECOLOGY

## 2017-12-11 PROCEDURE — 36591 DRAW BLOOD OFF VENOUS DEVICE: CPT

## 2017-12-11 ASSESSMENT — PAIN SCALES - GENERAL: PAINLEVEL: NO PAIN (0)

## 2017-12-11 NOTE — LETTER
2017       RE: Kymberly Miller  [address] [facility] [address]     Dear Colleague,    Thank you for referring your patient, Kymberly Miller, to the [facility]. Please see a copy of my visit note below.                Follow Up Notes on Referred Patient    Date: 2017       Dr. Terrell Barry  [facility]  [address]  [place] [address]       RE: Kymberly Miller  : 1933  SEBASTIAN: 2017    Dear Dr. Terrell Barry:    Kymberly Miller is a 84 year old woman with a diagnosis of  Stage IV high-grade serous ovarian carcinoma.   She is here today for follow-up after completing 6 cycles of neoadjuvant chemotherapy based on ERIN-7 protocol with carboplatin weekly of AUC of 2 as well as paclitaxel dose of 60 mg/m2 weekly. Her  normalized to 12.    Ms. Miller feels well after completing her chemo. States that she has some neuropathy on the bottom of her feet that has been getting a little worse with each cycle. Denies neuropathy in her hands. Denies nausea / vomiting. Has been able to tolerate regular diet. Denies vaginal bleeding or urinary symptoms. Struggles with constipation, but normal bowel function over last couple of weeks. Here to discuss surgery.    Review of Systems: negative with exception of above    Past Medical History:  Past Medical History:   Diagnosis Date     Ascites      Chronic diastolic heart failure (H)      Hyperlipidemia      Mild aortic stenosis      Moderate mitral regurgitation      Nonobstructive atherosclerosis of coronary artery      Obesity      Ovarian cancer (H)      Pacemaker     [employer] Model # [id number] Serial # [id number] Implant date 2016 Indication 3RD DEGREE BLOCK     Third degree heart block (H)      Past Surgical History:  Past Surgical History:   Procedure Laterality Date     CATARACT IOL, RT/LT       CHOLECYSTECTOMY       HC LEFT HEART CATHETERIZATION       IMPLANT PACEMAKER  2016     OTHER SURGICAL  HISTORY  06/26/2017    [facility]       Health Maintenance Due   Topic Date Due     TETANUS IMMUNIZATION (SYSTEM ASSIGNED)  09/30/1951     ADVANCE DIRECTIVE PLANNING Q5 YRS  09/30/1988     FALL RISK ASSESSMENT  09/30/1998     PNEUMOCOCCAL (1 of 2 - PCV13) 09/30/1998     INFLUENZA VACCINE (SYSTEM ASSIGNED)  09/01/2017     Current Medications:   Current Outpatient Prescriptions   Medication Sig Dispense Refill     lidocaine-prilocaine (EMLA) cream Apply topically as needed for moderate pain       aspirin 81 MG EC tablet Take 81 mg by mouth Takes three times a week       Cholecalciferol (VITAMIN D) 2000 UNITS tablet Take 2,000 Units by mouth daily        furosemide (LASIX) 40 MG tablet Take 40 mg by mouth daily        multivitamin, therapeutic with minerals (MULTI-VITAMIN) TABS tablet Take by mouth daily        Omega-3 Fatty Acids (FISH OIL) 1200 MG CAPS Take 1,200 mg by mouth daily        pravastatin (PRAVACHOL) 40 MG tablet Take 40 mg by mouth daily        iohexol (OMNIPAQUE) 140 MG/ML SOLN solution Mix entire bottle (50ml) of contast with 600ml (20 ounces) of water and drink half 2 hrs prior to CT scan and half 1 hr prior to scan (Patient not taking: Reported on 12/11/2017) 50 mL 0     POTASSIUM CHLORIDE PO Take 10 mEq by mouth daily       iohexol (OMNIPAQUE) 140 MG/ML SOLN solution Mix entire bottle (50ml) of contast with 600ml (20 ounces) of water and drink half 2 hrs prior to CT scan and half 1 hr prior to scan (Patient not taking: Reported on 9/18/2017) 50 mL 0     acetaminophen (TYLENOL) 325 MG tablet Take 650 mg by mouth as needed        PROCHLORPERAZINE MALEATE PO Take 10 mg by mouth       Allergies:   Allergies   Allergen Reactions     Atorvastatin      Muscle pain       Social History:  Social History   Substance Use Topics     Smoking status: Never Smoker     Smokeless tobacco: Never Used     Alcohol use 1.2 - 1.8 oz/week     2 - 3 Cans of beer per week       History   Drug Use Not on file  "    Family History:     Family History   Problem Relation Age of Onset     CEREBROVASCULAR DISEASE Mother      HEART DISEASE Father      Chronic Obstructive Pulmonary Disease Sister      Esophageal Cancer Brother 41     CEREBROVASCULAR DISEASE Brother 80     Heart Failure Son      Pacemaker Son      Physical Exam:     /72 (BP Location: Right arm, Patient Position: Sitting, Cuff Size: Adult Regular)  Pulse 88  Temp 98.2  F (36.8  C) (Oral)  Resp 16  Ht 1.672 m (5' 5.83\")  Wt 79.9 kg (176 lb 3.2 oz)  SpO2 98%  Breastfeeding? No  BMI 28.59 kg/m2  Body mass index is 28.59 kg/(m^2).    General Appearance: healthy and alert, no distress     HEENT:  no thyromegaly, no palpable nodules or masses        Cardiovascular: regular rate and rhythm, no gallops, rubs or murmurs     Respiratory: lungs clear, no rales, rhonchi or wheezes, normal diaphragmatic excursion    Musculoskeletal: extremities non tender and with trace bilateral edema    Skin: no lesions or rashes     Neurological: normal gait, no gross defects     Psychiatric: appropriate mood and affect                               Hematological: normal cervical, supraclavicular and inguinal lymph nodes     Gastrointestinal:       abdomen soft, non-tender, non-distended, no organomegaly or masses    Assessment:    Kymberly Miller is a 84 year old woman with a diagnosis of stage IV high-grade serous ovarian carcinoma s/p 6 cycles of neoadjuvant chemotherapy.     A total of 30 minutes was spent with the patient, 20 minutes of which were spent in counseling the patient and/or treatment planning.    Plan:     1.)    Stage IV high-grade serous ovarian carcinoma s/p 6 cycles of NACT: Positive treatment response. The 3 mm lung nodules are unchanged which makes me lean towards unrelated to carcinoma. Discussed with patient interval debulking. Risks, benefits, alternatives to therapy discussed. Patient desires interval debulking surgery. Questions solicited and answered. "     Patient elects for surgical management. Consented patient for  Exploratory laparotomy, BREA/BSO, omentectomy, possible pelvic and para-aortic lymph node dissection, possible bowel surgery, tumor debulking. Risks, benefits and alternatives to proceed discussed in detail with the patient. Risks include but are not limited to bleeding, infection, possible injury to surrounding organs including bowel, bladder, ureter, need for second procedure/surgery related to complications from first procedure, postoperative medical complications such as cardiopulmonary events, lymphedema, lymphocyst, thromboembolic events. Consent for surgery, blood transfusion signed. Will arrange appropriate preoperative blood work, CXR, EKG. Patient also advised on need for postoperative surveillance and/or adjuvant therapy. Questions answered.    José Cole MD, MS    Department of Obstetrics and Gynecology   Division of Gynecologic Oncology   [facility]  Phone: [phone]      CC  Patient Care Team:  Terrell Barry as PCP - General (Family Practice)  Lily Trejo as Referring Physician (Hematology & Oncology)

## 2017-12-11 NOTE — ANESTHESIA PREPROCEDURE EVALUATION
Anesthesia Evaluation     . Pt has had prior anesthetic. Type: General    No history of anesthetic complications          ROS/MED HX    ENT/Pulmonary:  - neg pulmonary ROS     Neurologic:  - neg neurologic ROS     Cardiovascular:     (+) Dyslipidemia, --CAD, --. : . . . pacemaker Reason placed: 3rd degree heart block:type: [employer] Dual Chamber  settings: DDDR 60-12- - Patient is dependent on pacemaker . valvular problems/murmurs type: MR moderate:. Previous cardiac testing Echodate:6/2016results:Transthoracic echo performed with the patient in third-degree heart block. The left ventricle is normal size. The left ventricular systolic function is hyperdynamic. The ejection fraction was estimated at 75%. There is mild concentric hypertrophy. There was severe left atrial enlargement. There was aortic valve thickening with perhaps trivial stenosis. There was probable mild mitral valve stenosis (see text). There was mild-to-moderate mitral regurgitation. Trace or physiologic pulmonic regurgitation. Moderate tricuspid regurgitation.  The right ventricular systolic pressure was calculated and elevated at 50 mmHg. Small pericardial effusion.  date: results: date: results:Cath date: 2015 results:1. Mild coronary artery disease without flow-limiting obstructions. 2. FFR in the LAD was checked and found to be within normal limits at 0.90, indicating good distal perfusion. 3. Right heart catheterization demonstrates an elevated right heart filling pressure as well as an elevated end-diastolic pressure. This patient may benefit from diuresis. Left ventriculogram shows normal LV function EF estimated 70%. No significant MR and a normal aortic root with thickened aortic valve leaflets.          METS/Exercise Tolerance:  >4 METS   Hematologic:  - neg hematologic  ROS       Musculoskeletal:   (+) arthritis, , , other musculoskeletal- Chronic left knee pain      GI/Hepatic:  - neg GI/hepatic ROS       Renal/Genitourinary:  - ROS  Renal section negative       Endo:         Psychiatric:  - neg psychiatric ROS       Infectious Disease:  - neg infectious disease ROS       Malignancy:   (+) Malignancy History of Other  Other CA Ovarian Cancer Active status post Chemo         Other:    (+) no H/O Chronic Pain,                   Physical Exam  Normal systems: cardiovascular and pulmonary    Airway   Mallampati: I  TM distance: <3 FB  Neck ROM: full    Dental   (+) missing and upper dentures    Cardiovascular   Rhythm and rate: regular and normal      Pulmonary    breath sounds clear to auscultation               PAC Discussion and Assessment    ASA Classification: 3  Case is suitable for: [place]  Anesthetic techniques and relevant risks discussed: GA  Invasive monitoring and risk discussed:   Types:   Possibility and Risk of blood transfusion discussed:   NPO instructions given:   Additional anesthetic preparation and risks discussed:   Needs early admission to pre-op area:   Other:     PAC Resident/NP Anesthesia Assessment:  Kymberly Miller is an 84 year old female who presents for pre-operative H & P in preparation for an Exploratory laparotomy, BREA/BSO, omentectomy, possible pelvic and para-aortic lymph node dissection, possible bowel surgery, tumor debulking   on 1/3/2018  with Dr. Cole for ovarian cancer.   PAC referral for risk assessment and optimization for anesthesia with comorbid conditions of:    Pre-operative considerations:  1.  Cardiac:  Functional status METS  >4  Risk of Major Adverse Cardiac event: 6.6%  -CAD,  Mod MR, 3rd degree heart block s/p pacemaker 2016   *ECHO 6/2016):  Transthoracic echo performed with the patient in third-degree heart block.  The left ventricle is normal size.  The left ventricular systolic function is hyperdynamic. The ejection fraction was estimated at 75%. There is mild concentric hypertrophy. There was severe left atrial enlargement. There was aortic valve thickening with perhaps trivial stenosis.  There was probable mild mitral valve stenosis (see text). There was mild-to-moderate mitral regurgitation. Trace or physiologic pulmonic regurgitation. Moderate tricuspid regurgitation.  The right ventricular systolic pressure was calculated and elevated at 50 mmHg. Small pericardial effusion.    *CATH 9/2015:  1. Mild coronary artery disease without flow-limiting obstructions. 2. FFR in the LAD was checked and found to be within normal limits at 0.90, indicating good distal perfusion. 3. Right heart catheterization demonstrates an elevated right heart filling pressure as well as an elevated end-diastolic pressure. This patient may benefit from diuresis. Left ventriculogram shows normal LV function EF estimated 70%. No significant MR and a normal aortic root with thickened aortic valve leaflets.   2.  Pulm:   CHRIS risk:  low  3.  GI:  Risk of PONV score =3 .  If > 2, anti-emetic intervention recommended.  4.  Meds:  -Antiplts/Anticoags: asa 81 mg, plan to be determined by surgery team    Patient is optimized and is acceptable candidate for the proposed procedure.  No further diagnostic evaluation is needed.    Patient also evaluated by Dr. Alvarez.    Emilia Burt MS, PA-C  12/11/17 6:44 PM        Mid-Level Provider/Resident:   Date:   Time:     Attending Anesthesiologist Anesthesia Assessment:        Anesthesiologist:   Date:   Time:   Pass/Fail:   Disposition:     PAC Pharmacist Assessment:        Pharmacist:   Date:   Time:                           .

## 2017-12-11 NOTE — PATIENT INSTRUCTIONS
Preparing for Your Surgery      Name:  Kymberly Miller   MRN:  [id number]   :  1933   Today's Date:  2017     Arriving for surgery:WE WILL CALL WITH DATE TIME AND LOCATION   Surgery date:    Arrival time:    Please come to:       What can I eat or drink?  -  You may have solid food or milk products until 8 hours prior to your surgery.  -  You may have water, apple juice or 7up/Sprite until 2 hours prior to your surgery.    Which medicines can I take?  -  Do NOT take these medications in the morning, the day of surgery:    lasix  STOP VITAMINS/MINERALS/FISH OIL  one week prior to surgery   Your surgeon should let you know when/if to stop the aspirin       -  Please take these medications the day of surgery:          How do I prepare myself?  -  Take two showers: one the night before surgery; and one the morning of surgery.         Use Scrubcare or Hibiclens to wash from neck down.  You may use your own shampoo and conditioner. No other hair products.   -  Do NOT use lotion, powder, deodorant, or antiperspirant the day of your surgery.  -  Do NOT wear any makeup, fingernail polish or jewelry.  -Do not bring your own medications to the hospital, except for inhalers and eye drops.  -  Bring your ID and insurance card.    Questions or Concerns:  If you have questions or concerns, please call the  Preoperative Assessment Center, Monday-Friday 7AM-7PM:  [phone]            AFTER YOUR SURGERY  Breathing exercises   Breathing exercises help you recover faster. Take deep breaths and let the air out slowly. This will:     Help you wake up after surgery.    Help prevent complications like pneumonia.  Preventing complications will help you go home sooner.   We may give you a breathing device (incentive spirometer) to encourage you to breathe deeply.   Nausea and vomiting   You may feel sick to your stomach after surgery; if so, let your nurse know.    Pain control:  After surgery, you may have pain. Our goal is to  help you manage your pain. Pain medicine will help you feel comfortable enough to do activities that will help you heal.  These activities may include breathing exercises, walking and physical therapy.   To help your health care team treat your pain we will ask: 1) If you have pain  2) where it is located 3) describe your pain in your words  Methods of pain control include medications given by mouth, vein or by nerve block for some surgeries.  We may give you a pain control pump that will:  1) Deliver the medicine through a tube placed in your vein  2) Control the amount of medicine you receive  3) Allow you to push a button to deliver a dose of pain medicine  Sequential Compression Device (SCD) or Pneumo Boots:  You may need to wear SCD S on your legs or feet. These are wraps connected to a machine that pumps in air and releases it. The repeated pumping helps prevent blood clots from forming.

## 2017-12-11 NOTE — MR AVS SNAPSHOT
"              After Visit Summary   12/11/2017    Kymberly Miller    MRN: [id number]           Patient Information     Date Of Birth          9/30/1933        Visit Information        Provider Department      12/11/2017 2:40 PM José Cole MD [facility]        Today's Diagnoses     Preoperative examination    -  1    Other specified conditions associated with female genital organs and menstrual cycle            Follow-ups after your visit        Your next 10 appointments already scheduled     Jan 03, 2018   Procedure with José Cole MD   [facility], [place], Same Day Surgery (--)    [address]  [place] [id number] [phone]              Who to contact     If you have questions or need follow up information about today's clinic visit or your schedule please contact [facility] directly at [phone].  Normal or non-critical lab and imaging results will be communicated to you by MysteryDhart, letter or phone within 4 business days after the clinic has received the results. If you do not hear from us within 7 days, please contact the clinic through MyChart or phone. If you have a critical or abnormal lab result, we will notify you by phone as soon as possible.  Submit refill requests through Davia or call your pharmacy and they will forward the refill request to us. Please allow 3 business days for your refill to be completed.          Additional Information About Your Visit        MyChart Information     Davia lets you send messages to your doctor, view your test results, renew your prescriptions, schedule appointments and more. To sign up, go to [email or web address] . Click on \"Log in\" on the left side of the screen, which will take you to the Welcome page. Then click on \"Sign up Now\" on the right side of the page.     You will be asked to enter the access code listed below, as well as some personal information. Please follow the directions to create your " "username and password.     Your access code is: [email or web address]  Expires: 2017 11:05 AM     Your access code will  in 90 days. If you need help or a new code, please call your [facility] or [phone].        Care EveryWhere ID     This is your Care EveryWhere ID. This could be used by other organizations to access your [facility] medical records  [id number]        Your Vitals Were     Pulse Temperature Respirations Height Pulse Oximetry Breastfeeding?    88 98.2  F (36.8  C) (Oral) 16 1.672 m (5' 5.83\") 98% No    BMI (Body Mass Index)                   28.59 kg/m2            Blood Pressure from Last 3 Encounters:   17 135/63   17 129/72   17 134/87    Weight from Last 3 Encounters:   17 80.1 kg (176 lb 9.6 oz)   17 79.9 kg (176 lb 3.2 oz)   17 75.9 kg (167 lb 4.8 oz)              We Performed the Following     Jennifer-Operative Worksheet - Exploratory Laparotomy Total Abdominal Hysterectomy, bilateral Salpingo-Oophorectomy, tumor debulking, omentectomy, possible intraperitoneal port placement     Jennifer-Operative Worksheet - Exploratory Laparotomy Total Abdominal Hysterectomy, bilateral Salpingo-Oophorectomy, tumor debulking, omentectomy, possible intraperitoneal port placement        Primary Care Provider Office Phone # Fax #    Terrell PEREZ Jhonny [phone]       [facility] [address]  [facility] [address]        Equal Access to Services     PRASHANT MUSA : Hadii prerna kaye hadasho Soomaali, waaxda luqadaha, qaybta kaalmada adeegyada, peter lea. So [facility] [phone].    ATENCIÓN: Si habla español, tiene a lozano disposición servicios gratuitos de asistencia lingüística. Llame al [phone].    We comply with applicable federal civil rights laws and [place] laws. We do not discriminate on the basis of race, color, national origin, age, disability, sex, sexual orientation, or gender identity.            Thank you!     " Thank you for choosing [facility]  for your care. Our goal is always to provide you with excellent care. Hearing back from our patients is one way we can continue to improve our services. Please take a few minutes to complete the written survey that you may receive in the mail after your visit with us. Thank you!             Your Updated Medication List - Protect others around you: Learn how to safely use, store and throw away your medicines at www.disposemymeds.org.          This list is accurate as of: 12/11/17 11:59 PM.  Always use your most recent med list.                   Brand Name Dispense Instructions for use Diagnosis    acetaminophen 325 MG tablet    TYLENOL     Take 650 mg by mouth as needed        aspirin 81 MG EC tablet      Take 81 mg by mouth every morning Takes three times a week        fish Oil 1200 MG capsule      Take 1,200 mg by mouth every morning        furosemide 40 MG tablet    LASIX     Take 20 mg by mouth every morning        lidocaine-prilocaine cream    EMLA     Apply topically as needed for moderate pain        Multi-vitamin Tabs tablet      Take 1 tablet by mouth every morning        pravastatin 40 MG tablet    PRAVACHOL     Take 40 mg by mouth At Bedtime        vitamin D 2000 UNITS tablet      Take 2,000 Units by mouth every morning

## 2017-12-11 NOTE — H&P
Pre-Operative H & P     CC:  Preoperative exam to assess for increased cardiopulmonary risk while undergoing surgery and anesthesia.    Date of Encounter: December 11, 2017   Primary Care Physician:  Terrell Barry  Kymberly Miller is an 84 year old female who presents for pre-operative H & P in preparation for an Exploratory laparotomy, BREA/BSO, omentectomy, possible pelvic and para-aortic lymph node dissection, possible bowel surgery, tumor debulking   on 1/3/2018  with Dr. Cole for ovarian cancer.    Ms. Miller has a diagnosis of stage IV high serous grade ovarian cancer, so the above surgery has been recommended.  She has a right chest port and has completed 6 cycles of neoadjuvant chemotherapy with carboplatin weekly as well as paclitaxel weekly.  Her cardiac history is significant for CAD, s/p cardiac pacemaker in 2016 for 3rd degree heart block, mild AS and moderate MR.   She is followed by cardiology in [place].    She has hyperlipidemia for which she is on Pravachol therapy. She has not had palpitations, TIA, or stroke like complaints. She has no symptoms attributable to her pacemaker.  She has never been a smoker and has no known pulmonary disease.        History is obtained from the patient and medical record including Care Everywhere.    Past Medical History  Past Medical History:   Diagnosis Date     Ascites     From ovarian cancer     Hyperlipidemia      Mild aortic stenosis      Moderate mitral regurgitation      Nonobstructive atherosclerosis of coronary artery      Obesity      Ovarian cancer (H)      Pacemaker 06/2016    New [employer] Generator Model # [id number] Serial # [id number] Implant date 6/6/2016 Indication 3RD DEGREE BLOCK         Past Surgical History  Past Surgical History:   Procedure Laterality Date     CATARACT IOL, RT/LT       CHOLECYSTECTOMY       HC LEFT HEART CATHETERIZATION  2015     IMPLANT PACEMAKER  06/03/2016     OTHER SURGICAL HISTORY  06/26/2017    right  chest port placement, power port         Prior to Admission Medications  Current Outpatient Prescriptions   Medication Sig Dispense Refill     lidocaine-prilocaine (EMLA) cream Apply topically as needed for moderate pain       acetaminophen (TYLENOL) 325 MG tablet Take 650 mg by mouth as needed        aspirin 81 MG EC tablet Take 81 mg by mouth every morning Takes three times a week       Cholecalciferol (VITAMIN D) 2000 UNITS tablet Take 2,000 Units by mouth every morning        furosemide (LASIX) 40 MG tablet Take 20 mg by mouth every morning        multivitamin, therapeutic with minerals (MULTI-VITAMIN) TABS tablet Take 1 tablet by mouth every morning        Omega-3 Fatty Acids (FISH OIL) 1200 MG CAPS Take 1,200 mg by mouth every morning        pravastatin (PRAVACHOL) 40 MG tablet Take 40 mg by mouth At Bedtime            Allergies  Atorvastatin     Social History  Social History     Social History     Marital status:      Spouse name: N/A     Number of children: N/A     Years of education: N/A     Occupational History     Not on file.     Social History Main Topics     Smoking status: Never Smoker     Smokeless tobacco: Never Used     Alcohol use 1.2 - 1.8 oz/week     2 - 3 Cans of beer per week     Drug use: No     Sexual activity: Not on file     Other Topics Concern     Not on file     Social History Narrative          Family History  Family History   Problem Relation Age of Onset     CEREBROVASCULAR DISEASE Mother      HEART DISEASE Father      Chronic Obstructive Pulmonary Disease Sister      Esophageal Cancer Brother 41     CEREBROVASCULAR DISEASE Brother 80     Heart Failure Son      Pacemaker Son         ROS   The complete review of systems is negative other than noted in the HPI or here.   Constitutional: Denies  fevers/chills.    EENT: Denies recent changes in hearing, mouth pain, or difficulty swallowing.  Cardiovascular: Denies chest pain, JIMENEZ or orthopnea, palpitations or  syncope.  Respiratory: Denies shortness of breath or significant cough.    GI: Denies nausea/vomiting     Musculoskeletal: Denies joint pain or swelling.    Skin: Denies rashes or wounds.    Neurologic: Denies history of stroke, TIA, migraines, seizures, dizziness, numbness/tingling.  Psychiatric: Denies changes in mood or affect.        Cardiac Testing   *ECHO 6/2016):  Transthoracic echo performed with the patient in third-degree heart block.  The left ventricle is normal size.  The left ventricular systolic function is hyperdynamic. The ejection fraction was estimated at 75%. There is mild concentric hypertrophy. There was severe left atrial enlargement. There was aortic valve thickening with perhaps trivial stenosis. There was probable mild mitral valve stenosis (see text). There was mild-to-moderate mitral regurgitation. Trace or physiologic pulmonic regurgitation. Moderate tricuspid regurgitation.  The right ventricular systolic pressure was calculated and elevated at 50 mmHg. Small pericardial effusion.    *CATH 9/2015:  1. Mild coronary artery disease without flow-limiting obstructions. 2. FFR in the LAD was checked and found to be within normal limits at 0.90, indicating good distal perfusion. 3. Right heart catheterization demonstrates an elevated right heart filling pressure as well as an elevated end-diastolic pressure. This patient may benefit from diuresis. Left ventriculogram shows normal LV function EF estimated 70%. No significant MR and a normal aortic root with thickened aortic valve leaflets.    Labs: (personally reviewed):  Lab Results   Component Value Date    WBC 7.0 12/11/2017    HGB 12.6 12/11/2017    HCT 38.1 12/11/2017     12/11/2017     12/11/2017    POTASSIUM 3.8 12/11/2017    VICKIE 9.1 12/11/2017    GLC 96 12/11/2017    CR 0.50 (L) 12/11/2017    BUN 15 12/11/2017    CO2 30 12/11/2017    ALT 24 12/11/2017    AST 21 12/11/2017    BILITOTAL 0.3 12/11/2017    ALKPHOS 83  "12/11/2017           Physical Exam:  No LMP recorded. Patient is not currently having periods (Reason: UNKNOWN).   Vital signs:  /63  Pulse 105  Temp 98.3  F (36.8  C) (Oral)  Resp 18  Ht 1.651 m (5' 5\")  Wt 80.1 kg (176 lb 9.6 oz)  SpO2 97%  BMI 29.39 kg/m2    Constitutional: Awake, alert, cooperative, no apparent distress, and appears stated age.  Eyes: Pupils equal, round and reactive to light, sclera clear, conjunctiva normal.  HENT: Normocephalic, oral pharynx with moist mucus membranes. No goiter appreciated.   Respiratory: Clear to auscultation bilaterally, no crackles or wheezing.  Cardiovascular: Regular rate and rhythm, normal S1 and S2, and no overt murmur noted.  Carotids +2, no bruits. No edema. Palpable pulses to radial  DP and PT arteries.   GI: Normal bowel sounds, soft, non-distended, non-tender, no masses palpated  Skin: Warm and dry.  No rashes at anticipated surgical site.   Musculoskeletal: Full ROM of neck. There is no redness, warmth, or swelling of the exposed joints. Gross motor strength is normal.    Neurologic: Awake, alert, oriented to name, place and time.  Gait is normal.   Neuropsychiatric: Calm, cooperative. Normal affect.     Assessment/Plan  Kymberly Miller is an 84 year old female who presents for pre-operative H & P in preparation for an Exploratory laparotomy, BREA/BSO, omentectomy, possible pelvic and para-aortic lymph node dissection, possible bowel surgery, tumor debulking   on 1/3/2018  with Dr. Cole for ovarian cancer.   PAC referral for risk assessment and optimization for anesthesia with comorbid conditions of:    Pre-operative considerations:  1.  Cardiac:  Functional status METS  >4  Risk of Major Adverse Cardiac event: 6.6%  -CAD,  Mod MR, 3rd degree heart block s/p pacemaker 2016   *ECHO 6/2016):  Transthoracic echo performed with the patient in third-degree heart block.  The left ventricle is normal size.  The left ventricular systolic function is " hyperdynamic. The ejection fraction was estimated at 75%. There is mild concentric hypertrophy. There was severe left atrial enlargement. There was aortic valve thickening with perhaps trivial stenosis. There was probable mild mitral valve stenosis (see text). There was mild-to-moderate mitral regurgitation. Trace or physiologic pulmonic regurgitation. Moderate tricuspid regurgitation.  The right ventricular systolic pressure was calculated and elevated at 50 mmHg. Small pericardial effusion.    *CATH 9/2015:  1. Mild coronary artery disease without flow-limiting obstructions. 2. FFR in the LAD was checked and found to be within normal limits at 0.90, indicating good distal perfusion. 3. Right heart catheterization demonstrates an elevated right heart filling pressure as well as an elevated end-diastolic pressure. This patient may benefit from diuresis. Left ventriculogram shows normal LV function EF estimated 70%. No significant MR and a normal aortic root with thickened aortic valve leaflets.   2.  Pulm:   CHRIS risk:  low  3.  GI:  Risk of PONV score =3 .  If > 2, anti-emetic intervention recommended.  4.  Meds:  -Antiplts/Anticoags: asa 81 mg, plan to be determined by surgery team    Patient is optimized and is acceptable candidate for the proposed procedure.  No further diagnostic evaluation is needed.      AVS given to patient regarding medication instructions,  surgery time/arrival time and NPO status.  Emilia HERNANDES-C   Preoperative Assessment Center  [facility]  Clinic and Surgery Center  Phone: [phone]  Fax: [phone]

## 2017-12-11 NOTE — NURSING NOTE
"Oncology Rooming Note    December 11, 2017 3:04 PM   Kymberly Miller is a 84 year old female who presents for:    Chief Complaint   Patient presents with     Port Draw     NO LABS DRAWN--NO ORDERS     Oncology Clinic Visit     return patient visit for pre-op related to personal history of ovarian cancer      Initial Vitals: /72 (BP Location: Right arm, Patient Position: Sitting, Cuff Size: Adult Regular)  Pulse 88  Temp 98.2  F (36.8  C) (Oral)  Resp 16  Ht 1.672 m (5' 5.83\")  Wt 79.9 kg (176 lb 3.2 oz)  SpO2 98%  Breastfeeding? No  BMI 28.59 kg/m2 Estimated body mass index is 28.59 kg/(m^2) as calculated from the following:    Height as of this encounter: 1.672 m (5' 5.83\").    Weight as of this encounter: 79.9 kg (176 lb 3.2 oz). Body surface area is 1.93 meters squared.  No Pain (0) Comment: Data Unavailable   No LMP recorded. Patient is not currently having periods (Reason: UNKNOWN).  Allergies reviewed: Yes  Medications reviewed: Yes    Medications: Medication refills not needed today.  Pharmacy name entered into EPIC: Data Unavailable    Clinical concerns: no concerns dr. fonseca was notified.    6 minutes for nursing intake (face to face time)     Delmy Carrillo CMA              "

## 2017-12-11 NOTE — NURSING NOTE
Chief Complaint   Patient presents with     Port Draw     NO LABS DRAWN--NO ORDERS     VS taken. Informed pt that no lab orders are in EPIC, that Dr Cole may place them after he sees her later today.  Pt has port accessed from CT earlier today.  Let her know she may be returning to lab later in day after orders are placed.  Pt states understanding.  Darcie Ponce RN

## 2017-12-11 NOTE — MR AVS SNAPSHOT
After Visit Summary   2017    Kymberly Miller    MRN: [id number]           Patient Information     Date Of Birth          1933        Visit Information        Provider Department      2017 5:00 PM Rn, [facility]        Care Instructions    Preparing for Your Surgery      Name:  Kymberly Miller   MRN:  [id number]   :  1933   Today's Date:  2017     Arriving for surgery:WE WILL CALL WITH DATE TIME AND LOCATION   Surgery date:    Arrival time:    Please come to:       What can I eat or drink?  -  You may have solid food or milk products until 8 hours prior to your surgery.  -  You may have water, apple juice or 7up/Sprite until 2 hours prior to your surgery.    Which medicines can I take?  -  Do NOT take these medications in the morning, the day of surgery:    lasix  STOP VITAMINS/MINERALS/FISH OIL  one week prior to surgery   Your surgeon should let you know when/if to stop the aspirin       -  Please take these medications the day of surgery:          How do I prepare myself?  -  Take two showers: one the night before surgery; and one the morning of surgery.         Use Scrubcare or Hibiclens to wash from neck down.  You may use your own shampoo and conditioner. No other hair products.   -  Do NOT use lotion, powder, deodorant, or antiperspirant the day of your surgery.  -  Do NOT wear any makeup, fingernail polish or jewelry.  -Do not bring your own medications to the hospital, except for inhalers and eye drops.  -  Bring your ID and insurance card.    Questions or Concerns:  If you have questions or concerns, please call the  Preoperative Assessment Center, Monday-Friday 7AM-7PM:  [phone]            AFTER YOUR SURGERY  Breathing exercises   Breathing exercises help you recover faster. Take deep breaths and let the air out slowly. This will:     Help you wake up after surgery.    Help prevent complications like pneumonia.  Preventing  complications will help you go home sooner.   We may give you a breathing device (incentive spirometer) to encourage you to breathe deeply.   Nausea and vomiting   You may feel sick to your stomach after surgery; if so, let your nurse know.    Pain control:  After surgery, you may have pain. Our goal is to help you manage your pain. Pain medicine will help you feel comfortable enough to do activities that will help you heal.  These activities may include breathing exercises, walking and physical therapy.   To help your health care team treat your pain we will ask: 1) If you have pain  2) where it is located 3) describe your pain in your words  Methods of pain control include medications given by mouth, vein or by nerve block for some surgeries.  We may give you a pain control pump that will:  1) Deliver the medicine through a tube placed in your vein  2) Control the amount of medicine you receive  3) Allow you to push a button to deliver a dose of pain medicine  Sequential Compression Device (SCD) or Pneumo Boots:  You may need to wear SCD S on your legs or feet. These are wraps connected to a machine that pumps in air and releases it. The repeated pumping helps prevent blood clots from forming.           Follow-ups after your visit        Your next 10 appointments already scheduled     Dec 11, 2017  5:45 PM [facility]   [facility] Draw with  [facility]RAW   [facility] Draw ([facility])    [address] [place] [id number] [phone]              Future tests that were ordered for you today     Open Future Orders        Priority Expected Expires Ordered    ABO/Rh type and screen Routine 12/11/2017 12/11/2018 12/11/2017    CMP - Comprehensive Metabolic Panel Routine  12/11/2018 12/11/2017    CBC with platelets differential Routine  12/11/2018 12/11/2017            Who to contact     Please call your clinic at [phone] to:    Ask questions about your  health    Make or cancel appointments    Discuss your medicines    Learn about your test results    Speak to your doctor   If you have compliments or concerns about an experience at your clinic, or if you wish to file a complaint, please contact [facility] Patient Relations at [phone] or email us at [email or web address]         Additional Information About Your Visit        Heyzaphart Information     Heyzaphart is an electronic gateway that provides easy, online access to your medical records. With [employer], you can request a clinic appointment, read your test results, renew a prescription or communicate with your care team.     To sign up for [employer] visit the website at [email or web address]   You will be asked to enter the access code listed below, as well as some personal information. Please follow the directions to create your username and password.     Your access code is: [email or web address]  Expires: 2017 11:05 AM     Your access code will  in 90 days. If you need help or a new code, please contact your [facility] Clinic or call [phone] for assistance.        Care EveryWhere ID     This is your Care EveryWhere ID. This could be used by other organizations to access your [facility] medical records  [id number]         Blood Pressure from Last 3 Encounters:   17 135/63   17 129/72   17 134/87    Weight from Last 3 Encounters:   17 80.1 kg (176 lb 9.6 oz)   17 79.9 kg (176 lb 3.2 oz)   17 75.9 kg (167 lb 4.8 oz)              Today, you had the following     No orders found for display       Primary Care Provider Office Phone # Fax #    Terrell Barry [phone]       [facility] [address]  [facility] [address]        Equal Access to Services     MARCY MUSA AH: Brianna Ayala, waaxda luqadaha, qaybta kaalmada adeamandayajalyn, peter kemp  ah. So [facility] [phone].    ATENCIÓN: Si king burrell, tiene a lozano disposición servicios gratuitos de asistencia lingüística. Ramos al [phone].    We comply with applicable federal civil rights laws and [place] laws. We do not discriminate on the basis of race, color, national origin, age, disability, sex, sexual orientation, or gender identity.            Thank you!     Thank you for choosing [facility]  for your care. Our goal is always to provide you with excellent care. Hearing back from our patients is one way we can continue to improve our services. Please take a few minutes to complete the written survey that you may receive in the mail after your visit with us. Thank you!             Your Updated Medication List - Protect others around you: Learn how to safely use, store and throw away your medicines at www.disposemymeds.org.          This list is accurate as of: 12/11/17  5:32 PM.  Always use your most recent med list.                   Brand Name Dispense Instructions for use Diagnosis    acetaminophen 325 MG tablet    TYLENOL     Take 650 mg by mouth as needed        aspirin 81 MG EC tablet      Take 81 mg by mouth every morning Takes three times a week        fish Oil 1200 MG capsule      Take 1,200 mg by mouth every morning        furosemide 40 MG tablet    LASIX     Take 20 mg by mouth every morning        lidocaine-prilocaine cream    EMLA     Apply topically as needed for moderate pain        Multi-vitamin Tabs tablet      Take 1 tablet by mouth every morning        pravastatin 40 MG tablet    PRAVACHOL     Take 40 mg by mouth At Bedtime        vitamin D 2000 UNITS tablet      Take 2,000 Units by mouth every morning

## 2017-12-11 NOTE — MR AVS SNAPSHOT
"              After Visit Summary   12/11/2017    Kymberly Miller    MRN: [id number]           Patient Information     Date Of Birth          9/30/1933        Visit Information        Provider Department      12/11/2017 4:00 PM 1,  Cv Device [facility]        Today's Diagnoses     Atrioventricular block, complete (H)    -  1       Follow-ups after your visit        Your next 10 appointments already scheduled     Jan 03, 2018   Procedure with José Cole MD   [facility] [place], Same Day Surgery (--)    [address]  [place] [address] [phone]              Who to contact     If you have questions or need follow up information about today's clinic visit or your schedule please contact [facility] directly at [phone].  Normal or non-critical lab and imaging results will be communicated to you by [email or web address], letter or phone within 4 business days after the clinic has received the results. If you do not hear from us within 7 days, please contact the clinic through [email or web address] or phone. If you have a critical or abnormal lab result, we will notify you by phone as soon as possible.  Submit refill requests through viblast or call your pharmacy and they will forward the refill request to us. Please allow 3 business days for your refill to be completed.          Additional Information About Your Visit        [employer] Information     viblast lets you send messages to your doctor, view your test results, renew your prescriptions, schedule appointments and more. To sign up, go to [email or web address] . Click on \"Log in\" on the left side of the screen, which will take you to the Welcome page. Then click on \"Sign up Now\" on the right side of the page.     You will be asked to enter the access code listed below, as well as some personal information. Please follow the directions to create your username and password.     Your access code is: [email or web address]  Expires: 12/21/2017 11:05 AM     Your access code " will  in 90 days. If you need help or a new code, please call your [facility] clinic or [phone].        Care EveryWhere ID     This is your Care EveryWhere ID. This could be used by other organizations to access your [facility] medical records  [id number]         Blood Pressure from Last 3 Encounters:   17 135/63   17 129/72   17 134/87    Weight from Last 3 Encounters:   17 80.1 kg (176 lb 9.6 oz)   17 79.9 kg (176 lb 3.2 oz)   17 75.9 kg (167 lb 4.8 oz)              Today, you had the following     No orders found for display       Primary Care Provider Office Phone # Fax #    Terrell PEREZ Leroyfanny [phone]       [facility] [address]  [facility] [address]        Equal Access to Services     PRASHANT MUSA : Hadii aad ku hadasho Soroya, waaxda luqadaha, qaybta kaalmada adeegyada, peter kemp . So [facility] [phone].    ATENCIÓN: Si habla español, tiene a lozano disposición servicios gratuitos de asistencia lingüística. Llame al [phone].    We comply with applicable federal civil rights laws and [place] laws. We do not discriminate on the basis of race, color, national origin, age, disability, sex, sexual orientation, or gender identity.            Thank you!     Thank you for choosing [facility]  for your care. Our goal is always to provide you with excellent care. Hearing back from our patients is one way we can continue to improve our services. Please take a few minutes to complete the written survey that you may receive in the mail after your visit with us. Thank you!             Your Updated Medication List - Protect others around you: Learn how to safely use, store and throw away your medicines at www.disposemymeds.org.          This list is accurate as of: 17 11:59 PM.  Always use your most recent med list.                   Brand Name Dispense Instructions for use Diagnosis    acetaminophen  325 MG tablet    TYLENOL     Take 650 mg by mouth as needed        aspirin 81 MG EC tablet      Take 81 mg by mouth every morning Takes three times a week        fish Oil 1200 MG capsule      Take 1,200 mg by mouth every morning        furosemide 40 MG tablet    LASIX     Take 20 mg by mouth every morning        lidocaine-prilocaine cream    EMLA     Apply topically as needed for moderate pain        Multi-vitamin Tabs tablet      Take 1 tablet by mouth every morning        pravastatin 40 MG tablet    PRAVACHOL     Take 40 mg by mouth At Bedtime        vitamin D 2000 UNITS tablet      Take 2,000 Units by mouth every morning

## 2017-12-12 ENCOUNTER — TELEPHONE (OUTPATIENT)
Dept: ONCOLOGY | Facility: CLINIC | Age: 82
End: 2017-12-12

## 2017-12-12 NOTE — NURSING NOTE
Pre Op Nurse Teaching Template    Relevant Diagnosis: Ovarian Cancer     Teaching Topic: Exploratory laparotomy, BREA, BSO, Omentectomy, possible lymph node dissection, possible bowel resection, tumor debulking    Person(s) involved in teaching :  Patient  &  Family   Motivation Level:  Asks Questions:    Yes      Eager to Learn:     Yes     Cooperative:          Yes    Receptive (willing. Able to accept information):    Yes      Patient and those who are listed above demonstrates understanding of the following:   Reason for the appointment, diagnosis and treatment plan:   Yes   Knowledge of proper use of medications and conditions for which they are ordered (with special attention to potential side effects or drug interactions): Yes   Which situations necessitate calling provider and whom to contact: Yes         Nutritional needs and diet plan:  Yes      Pain management techniques:     Yes, Pain Scale   Diet:   Yes, [facility] Diet Instructions    Teaching Concerns addressed: Yes    Infection Prevention:  Patient and those who are listed above demonstrate understanding of the following:  Pre-Op CHG Bathing Instructions: Yes  Surgical procedure site care taught:   Yes   Signs and symptoms of infection taught: Yes       Instructional Materials Used/Given:  [facility] Before You Surgery Booklet  Showering or Bathing before Surgery Instructions Hysterectomy Guidelines  Pain Assessment Tool   Home Care after Major Abdominal or Vaginal Surgery  Map  Accommodations Brochure  Phone numbers for [facility] and [facility]  Copy of Surgical Consent    Comments:  JANA Molina RN

## 2017-12-13 PROBLEM — I44.2 ATRIOVENTRICULAR BLOCK, COMPLETE (H): Status: ACTIVE | Noted: 2017-12-13

## 2017-12-13 PROBLEM — Z95.0 CARDIAC PACEMAKER IN SITU: Status: ACTIVE | Noted: 2017-12-13

## 2017-12-13 NOTE — PROGRESS NOTES
Patient seen in the PAC clinic for evaluation and iterative programming of her [employer] dual lead pacemaker per MD orders.  Normal pacemaker function.  148 mode switch episodes recorded - 6-38 sec.  Intrinsic rhythm = NSR with  @ 30 bpm.  AP = 4.2%.   = >99%.  Estimated battery longevity to ANYA = 10.2-10.6 years.  DDDR  bpm.  Recommend use of a magnet during electrocautery.  Patient routinely has her device checked at an outside facility.    Dual lead pacemaker

## 2017-12-21 ENCOUNTER — TELEPHONE (OUTPATIENT)
Dept: ONCOLOGY | Facility: CLINIC | Age: 82
End: 2017-12-21

## 2017-12-21 NOTE — TELEPHONE ENCOUNTER
----- Message from José Cole MD sent at 12/14/2017  7:38 PM CST -----  Regarding: FW: Plan for asa 81 mg  Ok to continue.  Thanks,  José  ----- Message -----     From: Emilia Burt PA-C     Sent: 12/11/2017   5:32 PM       To: José Cole MD  Subject: Plan for asa 81 mg                               I saw this patient in the PAC clinic today and did not see a plan for the asa 81 mg.  She has CAD, pacemaker, and moderate MR.  She follows with cardiology in [place] and I didn't see a plan in the records.   Please have your team communicate the asa plan to the patient if she can continue or not.    Thank you  Emilia Burt MS, PA-C

## 2017-12-21 NOTE — TELEPHONE ENCOUNTER
Patient was called with the OK to continue her daily Aspirin per Dr. Cole.     RN and patient discussed pre-operative education.     Patient grateful for the call.     Ana Laura oMlina RN

## 2018-01-02 ENCOUNTER — ANESTHESIA EVENT (OUTPATIENT)
Dept: SURGERY | Facility: CLINIC | Age: 83
DRG: 737 | End: 2018-01-02
Payer: MEDICARE

## 2018-01-03 ENCOUNTER — SURGERY (OUTPATIENT)
Age: 83
End: 2018-01-03
Payer: COMMERCIAL

## 2018-01-03 ENCOUNTER — ANESTHESIA (OUTPATIENT)
Dept: SURGERY | Facility: CLINIC | Age: 83
DRG: 737 | End: 2018-01-03
Payer: MEDICARE

## 2018-01-03 ENCOUNTER — HOSPITAL ENCOUNTER (INPATIENT)
Facility: CLINIC | Age: 83
LOS: 3 days | Discharge: HOME | DRG: 737 | End: 2018-01-06
Attending: OBSTETRICS & GYNECOLOGY | Admitting: OBSTETRICS & GYNECOLOGY
Payer: MEDICARE

## 2018-01-03 ENCOUNTER — APPOINTMENT (OUTPATIENT)
Dept: GENERAL RADIOLOGY | Facility: CLINIC | Age: 83
DRG: 737 | End: 2018-01-03
Attending: OBSTETRICS & GYNECOLOGY
Payer: MEDICARE

## 2018-01-03 DIAGNOSIS — N94.89 OTHER SPECIFIED CONDITIONS ASSOCIATED WITH FEMALE GENITAL ORGANS AND MENSTRUAL CYCLE: ICD-10-CM

## 2018-01-03 DIAGNOSIS — Z01.818 PREOPERATIVE EXAMINATION: ICD-10-CM

## 2018-01-03 DIAGNOSIS — Z90.710 S/P HYSTERECTOMY: ICD-10-CM

## 2018-01-03 DIAGNOSIS — C56.9 MALIGNANT NEOPLASM OF OVARY, UNSPECIFIED LATERALITY (H): Primary | ICD-10-CM

## 2018-01-03 LAB
ABO + RH BLD: NORMAL
ABO + RH BLD: NORMAL
ALBUMIN SERPL-MCNC: 3.2 G/DL (ref 3.4–5)
ALP SERPL-CCNC: 70 U/L (ref 40–150)
ALT SERPL W P-5'-P-CCNC: 50 U/L (ref 0–50)
ANION GAP SERPL CALCULATED.3IONS-SCNC: 9 MMOL/L (ref 3–14)
AST SERPL W P-5'-P-CCNC: 63 U/L (ref 0–45)
B-HCG SERPL-ACNC: 2 IU/L (ref 0–5)
BILIRUB SERPL-MCNC: 0.5 MG/DL (ref 0.2–1.3)
BLD GP AB SCN SERPL QL: NORMAL
BLOOD BANK CMNT PATIENT-IMP: NORMAL
BLOOD BANK CMNT PATIENT-IMP: NORMAL
BUN SERPL-MCNC: 12 MG/DL (ref 7–30)
CALCIUM SERPL-MCNC: 8.7 MG/DL (ref 8.5–10.1)
CHLORIDE SERPL-SCNC: 105 MMOL/L (ref 94–109)
CO2 SERPL-SCNC: 24 MMOL/L (ref 20–32)
CREAT SERPL-MCNC: 0.48 MG/DL (ref 0.52–1.04)
ERYTHROCYTE [DISTWIDTH] IN BLOOD BY AUTOMATED COUNT: 13.9 % (ref 10–15)
GFR SERPL CREATININE-BSD FRML MDRD: >90 ML/MIN/1.7M2
GLUCOSE BLDC GLUCOMTR-MCNC: 141 MG/DL (ref 70–99)
GLUCOSE SERPL-MCNC: 149 MG/DL (ref 70–99)
HCT VFR BLD AUTO: 38.8 % (ref 35–47)
HGB BLD-MCNC: 12.2 G/DL (ref 11.7–15.7)
HGB BLD-MCNC: 12.7 G/DL (ref 11.7–15.7)
INR PPP: 0.96 (ref 0.86–1.14)
INTERPRETATION ECG - MUSE: NORMAL
LACTATE BLD-SCNC: 0.7 MMOL/L (ref 0.7–2)
MAGNESIUM SERPL-MCNC: 1.7 MG/DL (ref 1.6–2.3)
MCH RBC QN AUTO: 33.7 PG (ref 26.5–33)
MCHC RBC AUTO-ENTMCNC: 32.7 G/DL (ref 31.5–36.5)
MCV RBC AUTO: 103 FL (ref 78–100)
MRSA DNA SPEC QL NAA+PROBE: NEGATIVE
PHOSPHATE SERPL-MCNC: 3.1 MG/DL (ref 2.5–4.5)
PLATELET # BLD AUTO: 170 10E9/L (ref 150–450)
POTASSIUM SERPL-SCNC: 3.5 MMOL/L (ref 3.4–5.3)
POTASSIUM SERPL-SCNC: 3.9 MMOL/L (ref 3.4–5.3)
PROT SERPL-MCNC: 6.7 G/DL (ref 6.8–8.8)
RBC # BLD AUTO: 3.77 10E12/L (ref 3.8–5.2)
SODIUM SERPL-SCNC: 137 MMOL/L (ref 133–144)
SPECIMEN EXP DATE BLD: NORMAL
SPECIMEN SOURCE: NORMAL
WBC # BLD AUTO: 15.1 10E9/L (ref 4–11)

## 2018-01-03 PROCEDURE — 93005 ELECTROCARDIOGRAM TRACING: CPT

## 2018-01-03 PROCEDURE — 25000128 H RX IP 250 OP 636: Performed by: OBSTETRICS & GYNECOLOGY

## 2018-01-03 PROCEDURE — 0DBU0ZZ EXCISION OF OMENTUM, OPEN APPROACH: ICD-10-PCS | Performed by: OBSTETRICS & GYNECOLOGY

## 2018-01-03 PROCEDURE — 40000171 ZZH STATISTIC PRE-PROCEDURE ASSESSMENT III: Performed by: OBSTETRICS & GYNECOLOGY

## 2018-01-03 PROCEDURE — 83735 ASSAY OF MAGNESIUM: CPT | Performed by: STUDENT IN AN ORGANIZED HEALTH CARE EDUCATION/TRAINING PROGRAM

## 2018-01-03 PROCEDURE — 36000062 ZZH SURGERY LEVEL 4 1ST 30 MIN - UMMC: Performed by: OBSTETRICS & GYNECOLOGY

## 2018-01-03 PROCEDURE — 85018 HEMOGLOBIN: CPT | Performed by: ANESTHESIOLOGY

## 2018-01-03 PROCEDURE — 80053 COMPREHEN METABOLIC PANEL: CPT | Performed by: STUDENT IN AN ORGANIZED HEALTH CARE EDUCATION/TRAINING PROGRAM

## 2018-01-03 PROCEDURE — A9270 NON-COVERED ITEM OR SERVICE: HCPCS | Mod: GY | Performed by: OBSTETRICS & GYNECOLOGY

## 2018-01-03 PROCEDURE — 58953 TAH RAD DISSECT FOR DEBULK: CPT | Mod: GC | Performed by: OBSTETRICS & GYNECOLOGY

## 2018-01-03 PROCEDURE — C9399 UNCLASSIFIED DRUGS OR BIOLOG: HCPCS | Performed by: NURSE ANESTHETIST, CERTIFIED REGISTERED

## 2018-01-03 PROCEDURE — 25000128 H RX IP 250 OP 636: Performed by: STUDENT IN AN ORGANIZED HEALTH CARE EDUCATION/TRAINING PROGRAM

## 2018-01-03 PROCEDURE — 25000128 H RX IP 250 OP 636: Performed by: ANESTHESIOLOGY

## 2018-01-03 PROCEDURE — 20000004 ZZH R&B ICU UMMC

## 2018-01-03 PROCEDURE — 40000986 XR CHEST 1 VW

## 2018-01-03 PROCEDURE — 71000015 ZZH RECOVERY PHASE 1 LEVEL 2 EA ADDTL HR: Performed by: OBSTETRICS & GYNECOLOGY

## 2018-01-03 PROCEDURE — 40000275 ZZH STATISTIC RCP TIME EA 10 MIN

## 2018-01-03 PROCEDURE — 85610 PROTHROMBIN TIME: CPT | Performed by: STUDENT IN AN ORGANIZED HEALTH CARE EDUCATION/TRAINING PROGRAM

## 2018-01-03 PROCEDURE — 88305 TISSUE EXAM BY PATHOLOGIST: CPT | Performed by: OBSTETRICS & GYNECOLOGY

## 2018-01-03 PROCEDURE — 25000128 H RX IP 250 OP 636: Performed by: NURSE ANESTHETIST, CERTIFIED REGISTERED

## 2018-01-03 PROCEDURE — 84132 ASSAY OF SERUM POTASSIUM: CPT | Performed by: ANESTHESIOLOGY

## 2018-01-03 PROCEDURE — 0UT70ZZ RESECTION OF BILATERAL FALLOPIAN TUBES, OPEN APPROACH: ICD-10-PCS | Performed by: OBSTETRICS & GYNECOLOGY

## 2018-01-03 PROCEDURE — 37000008 ZZH ANESTHESIA TECHNICAL FEE, 1ST 30 MIN: Performed by: OBSTETRICS & GYNECOLOGY

## 2018-01-03 PROCEDURE — 87640 STAPH A DNA AMP PROBE: CPT | Performed by: STUDENT IN AN ORGANIZED HEALTH CARE EDUCATION/TRAINING PROGRAM

## 2018-01-03 PROCEDURE — 00000146 ZZHCL STATISTIC GLUCOSE BY METER IP

## 2018-01-03 PROCEDURE — 00000155 ZZHCL STATISTIC H-CELL BLOCK W/STAIN: Performed by: OBSTETRICS & GYNECOLOGY

## 2018-01-03 PROCEDURE — 25000566 ZZH SEVOFLURANE, EA 15 MIN: Performed by: OBSTETRICS & GYNECOLOGY

## 2018-01-03 PROCEDURE — 88309 TISSUE EXAM BY PATHOLOGIST: CPT | Performed by: OBSTETRICS & GYNECOLOGY

## 2018-01-03 PROCEDURE — 25000125 ZZHC RX 250: Performed by: ANESTHESIOLOGY

## 2018-01-03 PROCEDURE — 93010 ELECTROCARDIOGRAM REPORT: CPT | Performed by: INTERNAL MEDICINE

## 2018-01-03 PROCEDURE — 37000009 ZZH ANESTHESIA TECHNICAL FEE, EACH ADDTL 15 MIN: Performed by: OBSTETRICS & GYNECOLOGY

## 2018-01-03 PROCEDURE — 0UT90ZZ RESECTION OF UTERUS, OPEN APPROACH: ICD-10-PCS | Performed by: OBSTETRICS & GYNECOLOGY

## 2018-01-03 PROCEDURE — 85027 COMPLETE CBC AUTOMATED: CPT | Performed by: STUDENT IN AN ORGANIZED HEALTH CARE EDUCATION/TRAINING PROGRAM

## 2018-01-03 PROCEDURE — 25000132 ZZH RX MED GY IP 250 OP 250 PS 637: Mod: GY | Performed by: STUDENT IN AN ORGANIZED HEALTH CARE EDUCATION/TRAINING PROGRAM

## 2018-01-03 PROCEDURE — 36000064 ZZH SURGERY LEVEL 4 EA 15 ADDTL MIN - UMMC: Performed by: OBSTETRICS & GYNECOLOGY

## 2018-01-03 PROCEDURE — 40000014 ZZH STATISTIC ARTERIAL MONITORING DAILY

## 2018-01-03 PROCEDURE — 27210794 ZZH OR GENERAL SUPPLY STERILE: Performed by: OBSTETRICS & GYNECOLOGY

## 2018-01-03 PROCEDURE — 27210995 ZZH RX 272: Performed by: OBSTETRICS & GYNECOLOGY

## 2018-01-03 PROCEDURE — 88112 CYTOPATH CELL ENHANCE TECH: CPT | Performed by: OBSTETRICS & GYNECOLOGY

## 2018-01-03 PROCEDURE — 25000125 ZZHC RX 250: Performed by: STUDENT IN AN ORGANIZED HEALTH CARE EDUCATION/TRAINING PROGRAM

## 2018-01-03 PROCEDURE — 25000132 ZZH RX MED GY IP 250 OP 250 PS 637: Mod: GY | Performed by: OBSTETRICS & GYNECOLOGY

## 2018-01-03 PROCEDURE — A9270 NON-COVERED ITEM OR SERVICE: HCPCS | Mod: GY | Performed by: STUDENT IN AN ORGANIZED HEALTH CARE EDUCATION/TRAINING PROGRAM

## 2018-01-03 PROCEDURE — 84702 CHORIONIC GONADOTROPIN TEST: CPT | Performed by: ANESTHESIOLOGY

## 2018-01-03 PROCEDURE — 88311 DECALCIFY TISSUE: CPT | Performed by: OBSTETRICS & GYNECOLOGY

## 2018-01-03 PROCEDURE — 83605 ASSAY OF LACTIC ACID: CPT | Performed by: STUDENT IN AN ORGANIZED HEALTH CARE EDUCATION/TRAINING PROGRAM

## 2018-01-03 PROCEDURE — 84100 ASSAY OF PHOSPHORUS: CPT | Performed by: STUDENT IN AN ORGANIZED HEALTH CARE EDUCATION/TRAINING PROGRAM

## 2018-01-03 PROCEDURE — 71000014 ZZH RECOVERY PHASE 1 LEVEL 2 FIRST HR: Performed by: OBSTETRICS & GYNECOLOGY

## 2018-01-03 PROCEDURE — 0UT20ZZ RESECTION OF BILATERAL OVARIES, OPEN APPROACH: ICD-10-PCS | Performed by: OBSTETRICS & GYNECOLOGY

## 2018-01-03 PROCEDURE — 87641 MR-STAPH DNA AMP PROBE: CPT | Performed by: STUDENT IN AN ORGANIZED HEALTH CARE EDUCATION/TRAINING PROGRAM

## 2018-01-03 PROCEDURE — C9290 INJ, BUPIVACAINE LIPOSOME: HCPCS | Performed by: STUDENT IN AN ORGANIZED HEALTH CARE EDUCATION/TRAINING PROGRAM

## 2018-01-03 RX ORDER — ONDANSETRON 4 MG/1
4 TABLET, ORALLY DISINTEGRATING ORAL EVERY 8 HOURS
Status: DISPENSED | OUTPATIENT
Start: 2018-01-03 | End: 2018-01-04

## 2018-01-03 RX ORDER — DIPHENHYDRAMINE HYDROCHLORIDE 50 MG/ML
12.5 INJECTION INTRAMUSCULAR; INTRAVENOUS EVERY 6 HOURS PRN
Status: DISCONTINUED | OUTPATIENT
Start: 2018-01-03 | End: 2018-01-04

## 2018-01-03 RX ORDER — DEXAMETHASONE SODIUM PHOSPHATE 4 MG/ML
8 INJECTION, SOLUTION INTRA-ARTICULAR; INTRALESIONAL; INTRAMUSCULAR; INTRAVENOUS; SOFT TISSUE ONCE
Status: COMPLETED | OUTPATIENT
Start: 2018-01-03 | End: 2018-01-03

## 2018-01-03 RX ORDER — POTASSIUM CHLORIDE 1500 MG/1
20-40 TABLET, EXTENDED RELEASE ORAL
Status: DISCONTINUED | OUTPATIENT
Start: 2018-01-03 | End: 2018-01-06 | Stop reason: HOSPADM

## 2018-01-03 RX ORDER — SODIUM CHLORIDE, SODIUM LACTATE, POTASSIUM CHLORIDE, CALCIUM CHLORIDE 600; 310; 30; 20 MG/100ML; MG/100ML; MG/100ML; MG/100ML
INJECTION, SOLUTION INTRAVENOUS CONTINUOUS
Status: DISCONTINUED | OUTPATIENT
Start: 2018-01-03 | End: 2018-01-03

## 2018-01-03 RX ORDER — LIDOCAINE 40 MG/G
CREAM TOPICAL
Status: DISCONTINUED | OUTPATIENT
Start: 2018-01-03 | End: 2018-01-06 | Stop reason: HOSPADM

## 2018-01-03 RX ORDER — BISACODYL 10 MG
10 SUPPOSITORY, RECTAL RECTAL DAILY
Status: DISCONTINUED | OUTPATIENT
Start: 2018-01-03 | End: 2018-01-06 | Stop reason: HOSPADM

## 2018-01-03 RX ORDER — SODIUM CHLORIDE, SODIUM LACTATE, POTASSIUM CHLORIDE, CALCIUM CHLORIDE 600; 310; 30; 20 MG/100ML; MG/100ML; MG/100ML; MG/100ML
INJECTION, SOLUTION INTRAVENOUS CONTINUOUS
Status: DISCONTINUED | OUTPATIENT
Start: 2018-01-03 | End: 2018-01-04

## 2018-01-03 RX ORDER — OXYCODONE HYDROCHLORIDE 5 MG/1
5-10 TABLET ORAL
Status: DISCONTINUED | OUTPATIENT
Start: 2018-01-03 | End: 2018-01-03

## 2018-01-03 RX ORDER — FENTANYL CITRATE 50 UG/ML
25-50 INJECTION, SOLUTION INTRAMUSCULAR; INTRAVENOUS
Status: DISCONTINUED | OUTPATIENT
Start: 2018-01-03 | End: 2018-01-03 | Stop reason: HOSPADM

## 2018-01-03 RX ORDER — NALOXONE HYDROCHLORIDE 0.4 MG/ML
.1-.4 INJECTION, SOLUTION INTRAMUSCULAR; INTRAVENOUS; SUBCUTANEOUS
Status: DISCONTINUED | OUTPATIENT
Start: 2018-01-03 | End: 2018-01-03 | Stop reason: HOSPADM

## 2018-01-03 RX ORDER — PROPOFOL 10 MG/ML
INJECTION, EMULSION INTRAVENOUS PRN
Status: DISCONTINUED | OUTPATIENT
Start: 2018-01-03 | End: 2018-01-03

## 2018-01-03 RX ORDER — ONDANSETRON 2 MG/ML
INJECTION INTRAMUSCULAR; INTRAVENOUS PRN
Status: DISCONTINUED | OUTPATIENT
Start: 2018-01-03 | End: 2018-01-03

## 2018-01-03 RX ORDER — SIMETHICONE 80 MG
80 TABLET,CHEWABLE ORAL 4 TIMES DAILY PRN
Status: DISCONTINUED | OUTPATIENT
Start: 2018-01-03 | End: 2018-01-03

## 2018-01-03 RX ORDER — SODIUM CHLORIDE, SODIUM LACTATE, POTASSIUM CHLORIDE, CALCIUM CHLORIDE 600; 310; 30; 20 MG/100ML; MG/100ML; MG/100ML; MG/100ML
INJECTION, SOLUTION INTRAVENOUS CONTINUOUS
Status: DISCONTINUED | OUTPATIENT
Start: 2018-01-03 | End: 2018-01-03 | Stop reason: HOSPADM

## 2018-01-03 RX ORDER — ACETAMINOPHEN 325 MG/1
650 TABLET ORAL EVERY 6 HOURS
Status: DISCONTINUED | OUTPATIENT
Start: 2018-01-03 | End: 2018-01-06 | Stop reason: HOSPADM

## 2018-01-03 RX ORDER — POTASSIUM CHLORIDE 29.8 MG/ML
20 INJECTION INTRAVENOUS
Status: DISCONTINUED | OUTPATIENT
Start: 2018-01-03 | End: 2018-01-06 | Stop reason: HOSPADM

## 2018-01-03 RX ORDER — AMOXICILLIN 250 MG
1-2 CAPSULE ORAL 2 TIMES DAILY
Status: DISCONTINUED | OUTPATIENT
Start: 2018-01-03 | End: 2018-01-06 | Stop reason: HOSPADM

## 2018-01-03 RX ORDER — ONDANSETRON 4 MG/1
4 TABLET, ORALLY DISINTEGRATING ORAL EVERY 8 HOURS PRN
Status: DISCONTINUED | OUTPATIENT
Start: 2018-01-04 | End: 2018-01-06 | Stop reason: HOSPADM

## 2018-01-03 RX ORDER — BUPIVACAINE HYDROCHLORIDE 2.5 MG/ML
INJECTION, SOLUTION EPIDURAL; INFILTRATION; INTRACAUDAL PRN
Status: DISCONTINUED | OUTPATIENT
Start: 2018-01-03 | End: 2018-01-03

## 2018-01-03 RX ORDER — LABETALOL HYDROCHLORIDE 5 MG/ML
10 INJECTION, SOLUTION INTRAVENOUS
Status: DISCONTINUED | OUTPATIENT
Start: 2018-01-03 | End: 2018-01-03

## 2018-01-03 RX ORDER — LIDOCAINE 40 MG/G
CREAM TOPICAL
Status: DISCONTINUED | OUTPATIENT
Start: 2018-01-03 | End: 2018-01-03 | Stop reason: HOSPADM

## 2018-01-03 RX ORDER — NALOXONE HYDROCHLORIDE 0.4 MG/ML
.1-.4 INJECTION, SOLUTION INTRAMUSCULAR; INTRAVENOUS; SUBCUTANEOUS
Status: DISCONTINUED | OUTPATIENT
Start: 2018-01-03 | End: 2018-01-06 | Stop reason: HOSPADM

## 2018-01-03 RX ORDER — CEFAZOLIN SODIUM 1 G/3ML
1 INJECTION, POWDER, FOR SOLUTION INTRAMUSCULAR; INTRAVENOUS SEE ADMIN INSTRUCTIONS
Status: DISCONTINUED | OUTPATIENT
Start: 2018-01-03 | End: 2018-01-03 | Stop reason: HOSPADM

## 2018-01-03 RX ORDER — FENTANYL CITRATE 50 UG/ML
INJECTION, SOLUTION INTRAMUSCULAR; INTRAVENOUS PRN
Status: DISCONTINUED | OUTPATIENT
Start: 2018-01-03 | End: 2018-01-03

## 2018-01-03 RX ORDER — FENTANYL CITRATE 50 UG/ML
25-50 INJECTION, SOLUTION INTRAMUSCULAR; INTRAVENOUS
Status: DISCONTINUED | OUTPATIENT
Start: 2018-01-03 | End: 2018-01-03

## 2018-01-03 RX ORDER — NALOXONE HYDROCHLORIDE 0.4 MG/ML
.1-.4 INJECTION, SOLUTION INTRAMUSCULAR; INTRAVENOUS; SUBCUTANEOUS
Status: DISCONTINUED | OUTPATIENT
Start: 2018-01-03 | End: 2018-01-03

## 2018-01-03 RX ORDER — HYDROMORPHONE HCL/0.9% NACL/PF 0.2MG/0.2
0.2 SYRINGE (ML) INTRAVENOUS
Status: DISCONTINUED | OUTPATIENT
Start: 2018-01-03 | End: 2018-01-03

## 2018-01-03 RX ORDER — POTASSIUM CHLORIDE 7.45 MG/ML
10 INJECTION INTRAVENOUS
Status: DISCONTINUED | OUTPATIENT
Start: 2018-01-03 | End: 2018-01-06 | Stop reason: HOSPADM

## 2018-01-03 RX ORDER — FLUMAZENIL 0.1 MG/ML
0.2 INJECTION, SOLUTION INTRAVENOUS
Status: DISCONTINUED | OUTPATIENT
Start: 2018-01-03 | End: 2018-01-03 | Stop reason: HOSPADM

## 2018-01-03 RX ORDER — POTASSIUM CL/LIDO/0.9 % NACL 10MEQ/0.1L
10 INTRAVENOUS SOLUTION, PIGGYBACK (ML) INTRAVENOUS
Status: DISCONTINUED | OUTPATIENT
Start: 2018-01-03 | End: 2018-01-06 | Stop reason: HOSPADM

## 2018-01-03 RX ORDER — ONDANSETRON 2 MG/ML
4 INJECTION INTRAMUSCULAR; INTRAVENOUS EVERY 30 MIN PRN
Status: DISCONTINUED | OUTPATIENT
Start: 2018-01-03 | End: 2018-01-03 | Stop reason: HOSPADM

## 2018-01-03 RX ORDER — DIPHENHYDRAMINE HCL 12.5MG/5ML
12.5 LIQUID (ML) ORAL EVERY 6 HOURS PRN
Status: DISCONTINUED | OUTPATIENT
Start: 2018-01-03 | End: 2018-01-04

## 2018-01-03 RX ORDER — MAGNESIUM SULFATE HEPTAHYDRATE 40 MG/ML
4 INJECTION, SOLUTION INTRAVENOUS EVERY 4 HOURS PRN
Status: DISCONTINUED | OUTPATIENT
Start: 2018-01-03 | End: 2018-01-06 | Stop reason: HOSPADM

## 2018-01-03 RX ORDER — CEFAZOLIN SODIUM 2 G/100ML
2 INJECTION, SOLUTION INTRAVENOUS
Status: DISCONTINUED | OUTPATIENT
Start: 2018-01-03 | End: 2018-01-03 | Stop reason: HOSPADM

## 2018-01-03 RX ORDER — CALCIUM CARBONATE 500 MG/1
1000 TABLET, CHEWABLE ORAL 4 TIMES DAILY PRN
Status: DISCONTINUED | OUTPATIENT
Start: 2018-01-03 | End: 2018-01-06 | Stop reason: HOSPADM

## 2018-01-03 RX ORDER — POTASSIUM CHLORIDE 1.5 G/1.58G
20-40 POWDER, FOR SOLUTION ORAL
Status: DISCONTINUED | OUTPATIENT
Start: 2018-01-03 | End: 2018-01-06 | Stop reason: HOSPADM

## 2018-01-03 RX ORDER — OXYCODONE HYDROCHLORIDE 5 MG/1
5-10 TABLET ORAL
Status: DISCONTINUED | OUTPATIENT
Start: 2018-01-03 | End: 2018-01-06 | Stop reason: HOSPADM

## 2018-01-03 RX ORDER — ASPIRIN 81 MG/1
81 TABLET ORAL EVERY MORNING
Status: DISCONTINUED | OUTPATIENT
Start: 2018-01-04 | End: 2018-01-06 | Stop reason: HOSPADM

## 2018-01-03 RX ORDER — ONDANSETRON 4 MG/1
4 TABLET, ORALLY DISINTEGRATING ORAL EVERY 30 MIN PRN
Status: DISCONTINUED | OUTPATIENT
Start: 2018-01-03 | End: 2018-01-03 | Stop reason: HOSPADM

## 2018-01-03 RX ORDER — CEFAZOLIN SODIUM 2 G/100ML
2 INJECTION, SOLUTION INTRAVENOUS
Status: COMPLETED | OUTPATIENT
Start: 2018-01-03 | End: 2018-01-03

## 2018-01-03 RX ADMIN — HYDROMORPHONE HYDROCHLORIDE 0.3 MG: 1 INJECTION, SOLUTION INTRAMUSCULAR; INTRAVENOUS; SUBCUTANEOUS at 11:19

## 2018-01-03 RX ADMIN — FENTANYL CITRATE 50 MCG: 50 INJECTION, SOLUTION INTRAMUSCULAR; INTRAVENOUS at 10:01

## 2018-01-03 RX ADMIN — HYDROMORPHONE HYDROCHLORIDE 0.3 MG: 1 INJECTION, SOLUTION INTRAMUSCULAR; INTRAVENOUS; SUBCUTANEOUS at 11:11

## 2018-01-03 RX ADMIN — ROCURONIUM BROMIDE 40 MG: 10 INJECTION INTRAVENOUS at 07:39

## 2018-01-03 RX ADMIN — MAGNESIUM HYDROXIDE 15 ML: 400 SUSPENSION ORAL at 18:28

## 2018-01-03 RX ADMIN — FENTANYL CITRATE 25 MCG: 50 INJECTION INTRAMUSCULAR; INTRAVENOUS at 10:52

## 2018-01-03 RX ADMIN — FENTANYL CITRATE 50 MCG: 50 INJECTION INTRAMUSCULAR; INTRAVENOUS at 07:27

## 2018-01-03 RX ADMIN — FENTANYL CITRATE 75 MCG: 50 INJECTION, SOLUTION INTRAMUSCULAR; INTRAVENOUS at 08:02

## 2018-01-03 RX ADMIN — SODIUM CHLORIDE, POTASSIUM CHLORIDE, SODIUM LACTATE AND CALCIUM CHLORIDE: 600; 310; 30; 20 INJECTION, SOLUTION INTRAVENOUS at 07:27

## 2018-01-03 RX ADMIN — FENTANYL CITRATE 25 MCG: 50 INJECTION, SOLUTION INTRAMUSCULAR; INTRAVENOUS at 07:39

## 2018-01-03 RX ADMIN — HYDROMORPHONE HYDROCHLORIDE 0.3 MG: 1 INJECTION, SOLUTION INTRAMUSCULAR; INTRAVENOUS; SUBCUTANEOUS at 12:26

## 2018-01-03 RX ADMIN — PHENYLEPHRINE HYDROCHLORIDE 100 MCG: 10 INJECTION, SOLUTION INTRAMUSCULAR; INTRAVENOUS; SUBCUTANEOUS at 09:29

## 2018-01-03 RX ADMIN — POTASSIUM CHLORIDE 20 MEQ: 750 TABLET, EXTENDED RELEASE ORAL at 18:19

## 2018-01-03 RX ADMIN — Medication 2 G: at 17:38

## 2018-01-03 RX ADMIN — SENNOSIDES AND DOCUSATE SODIUM 1 TABLET: 8.6; 5 TABLET ORAL at 19:51

## 2018-01-03 RX ADMIN — Medication 1 PAD.: at 09:53

## 2018-01-03 RX ADMIN — CEFAZOLIN SODIUM 2 G: 2 INJECTION, SOLUTION INTRAVENOUS at 07:53

## 2018-01-03 RX ADMIN — FENTANYL CITRATE 25 MCG: 50 INJECTION INTRAMUSCULAR; INTRAVENOUS at 10:55

## 2018-01-03 RX ADMIN — SODIUM CHLORIDE, POTASSIUM CHLORIDE, SODIUM LACTATE AND CALCIUM CHLORIDE: 600; 310; 30; 20 INJECTION, SOLUTION INTRAVENOUS at 18:50

## 2018-01-03 RX ADMIN — BUPIVACAINE HYDROCHLORIDE 20 ML: 2.5 INJECTION, SOLUTION EPIDURAL; INFILTRATION; INTRACAUDAL at 07:15

## 2018-01-03 RX ADMIN — FENTANYL CITRATE 50 MCG: 50 INJECTION, SOLUTION INTRAMUSCULAR; INTRAVENOUS at 10:11

## 2018-01-03 RX ADMIN — FENTANYL CITRATE 50 MCG: 50 INJECTION INTRAMUSCULAR; INTRAVENOUS at 11:01

## 2018-01-03 RX ADMIN — FENTANYL CITRATE 50 MCG: 50 INJECTION, SOLUTION INTRAMUSCULAR; INTRAVENOUS at 10:22

## 2018-01-03 RX ADMIN — ONDANSETRON 4 MG: 2 INJECTION INTRAMUSCULAR; INTRAVENOUS at 10:57

## 2018-01-03 RX ADMIN — HYDROMORPHONE HYDROCHLORIDE 0.4 MG: 1 INJECTION, SOLUTION INTRAMUSCULAR; INTRAVENOUS; SUBCUTANEOUS at 11:38

## 2018-01-03 RX ADMIN — SODIUM CHLORIDE, POTASSIUM CHLORIDE, SODIUM LACTATE AND CALCIUM CHLORIDE: 600; 310; 30; 20 INJECTION, SOLUTION INTRAVENOUS at 10:02

## 2018-01-03 RX ADMIN — PROPOFOL 70 MG: 10 INJECTION, EMULSION INTRAVENOUS at 07:39

## 2018-01-03 RX ADMIN — ACETAMINOPHEN 650 MG: 325 TABLET, FILM COATED ORAL at 14:04

## 2018-01-03 RX ADMIN — ROCURONIUM BROMIDE 20 MG: 10 INJECTION INTRAVENOUS at 08:42

## 2018-01-03 RX ADMIN — ACETAMINOPHEN 650 MG: 325 TABLET, FILM COATED ORAL at 19:51

## 2018-01-03 RX ADMIN — OXYCODONE HYDROCHLORIDE 5 MG: 5 TABLET ORAL at 15:59

## 2018-01-03 RX ADMIN — HYDROMORPHONE HYDROCHLORIDE 0.3 MG: 1 INJECTION, SOLUTION INTRAMUSCULAR; INTRAVENOUS; SUBCUTANEOUS at 11:54

## 2018-01-03 RX ADMIN — CEFAZOLIN SODIUM 1 G: 2 INJECTION, SOLUTION INTRAVENOUS at 09:45

## 2018-01-03 RX ADMIN — HYDROMORPHONE HYDROCHLORIDE 0.4 MG: 1 INJECTION, SOLUTION INTRAMUSCULAR; INTRAVENOUS; SUBCUTANEOUS at 12:05

## 2018-01-03 RX ADMIN — ONDANSETRON 4 MG: 2 INJECTION INTRAMUSCULAR; INTRAVENOUS at 09:36

## 2018-01-03 RX ADMIN — DEXAMETHASONE SODIUM PHOSPHATE 8 MG: 4 INJECTION, SOLUTION INTRAMUSCULAR; INTRAVENOUS at 11:59

## 2018-01-03 RX ADMIN — BUPIVACAINE 20 ML: 13.3 INJECTION, SUSPENSION, LIPOSOMAL INFILTRATION at 07:15

## 2018-01-03 RX ADMIN — ROCURONIUM BROMIDE 10 MG: 10 INJECTION INTRAVENOUS at 08:21

## 2018-01-03 RX ADMIN — ROCURONIUM BROMIDE 20 MG: 10 INJECTION INTRAVENOUS at 09:28

## 2018-01-03 RX ADMIN — SUGAMMADEX 200 MG: 100 INJECTION, SOLUTION INTRAVENOUS at 10:27

## 2018-01-03 RX ADMIN — OXYCODONE HYDROCHLORIDE 5 MG: 5 TABLET ORAL at 21:00

## 2018-01-03 RX ADMIN — MIDAZOLAM 0.5 MG: 1 INJECTION INTRAMUSCULAR; INTRAVENOUS at 07:27

## 2018-01-03 NOTE — IP AVS SNAPSHOT
[address] [address]    Phone:  [phone]                                       After Visit Summary   1/3/2018    Kymberly Miller    MRN: [id number]           After Visit Summary Signature Page     I have received my discharge instructions, and my questions have been answered. I have discussed any challenges I see with this plan with the nurse or doctor.    ..........................................................................................................................................  Patient/Patient Representative Signature      ..........................................................................................................................................  Patient Representative Print Name and Relationship to Patient    ..................................................               ................................................  Date                                            Time    ..........................................................................................................................................  Reviewed by Signature/Title    ...................................................              ..............................................  Date                                                            Time

## 2018-01-03 NOTE — LETTER
Transition Communication Hand-off for Care Transitions to Next Level of Care Provider    Name: Kymberly Miller  MRN #: [id number]  Primary Care Provider: Katrina Mcclendon MD     Primary Clinic: [place] [address]     Reason for Hospitalization:  Ovarian cancer (H) [C56.9]  Admit Date/Time: 1/3/2018  5:25 AM  Discharge Date: 1/6/2018  Payor Source: Payor: BCBS / Plan: BCBS PLATINUM BLUE / Product Type: PPO /          Reason for Communication Hand-off Referral: Avoidable readmission within 30 days    Discharge Needs Assessment:  Needs       Most Recent Value    Equipment Currently Used at Home raised toilet, shower chair, walker, rolling, cane, straight, grab bar [has cane, FWW but does not use them]    Transportation Available car, family or friend will provide        Follow-up specialty is recommended: Yes    Follow-up plan:  Future Appointments  Date Time Provider Department Center   1/22/2018 11:40 AM José Cole MD [facility]       Megan Garrison RN, BSN  Care Coordinator,   Phone [phone]  Pager [phone]    AVS/Discharge Summary is the source of truth; this is a helpful guide for improved communication of patient story

## 2018-01-03 NOTE — PROGRESS NOTES
Min RN paged. If cautery used- place magnet over device. Labs sent. Type and screen extension sheet sent. Gyn ONC paged d/t abbreviations on consent.

## 2018-01-03 NOTE — OP NOTE
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, R0    Procedure: Exploratory Laparotomy, Total Abdominal Hysterectomy, Bilateral Salpingo-Oophorectomy, Tumor Debulking, Omentectomy    Surgeon:              José Cole MD - Primary  Arley Wooten MD - Resident - Assisting  BladimirpenNell de la cruz MD - Assisting    Anesthesia: Combined General with Block                        Estimated blood loss: 100 ml  Fluids: 1000 ml  UOP: 220 ml  Drains: None    Specimens:                 ID Type Source Tests Collected by Time Destination   A : pelvic washings Washings Pelvis CYTOLOGY NON GYN José Cole MD 1/3/2018  8:23 AM     B : posterior cul de sac Tissue Other SURGICAL PATHOLOGY EXAM José Cole MD 1/3/2018  8:32 AM     C : Uterus, Cervix, Bilateral Tubes and Ovaries Tissue Other SURGICAL PATHOLOGY EXAM José Cole MD 1/3/2018  9:07 AM     D : omentum Tissue Omentum SURGICAL PATHOLOGY EXAM José Cole MD 1/3/2018  9:31 AM     E : diaphram nodule Tissue Other SURGICAL PATHOLOGY EXAM José Cole MD 1/3/2018  9:33 AM        Findings: EUA: small midline mobile uterus, no adnexal masses, atrophic vagina. Laparotomy: Normal appearing uterus, tubes, ovaries. Small nodules lining Pouch of Luis A. Thickened omentum without tonny disease. Single small diaphragmatic nodule. Remainder of survey normal.    Complications: None.    Indications: Kymberly Miller is a 84 year old woman with a diagnosis of stage IV high-grade serous ovarian carcinoma s/p 6 cycles of neoadjuvant chemotherapy. Surgical management has been recommended including exploratory laparotomy, total abdominal hysterectomy, BSO, omentectomy, and possible debulking. The risks, benefits and alternatives of surgical management were  discussed in detail with the patient and she signed an informed consent on the morning of the procedure.    Procedure:  The patient was taken to the operating room where she underwent general endotrachael anesthesia without difficulty. She was placed in the doral lithotomy position and prepped and draped in the usual sterile fashion. A vertical skin incision was made extending from the symphysis pubis to appx 4 cm above the umbilicus. This was carried down to the fascia with cautery. The fascia was then elevated, entered with cautery, and the incision extended towards both apexes with care to protect the underlying bowel. The pelvis was examined with the above noted findings. A Bookwalter  retractor was assembled, the bowel packed away with moist laparotomy sponges. Two body wall retractors were placed bilaterally at the pelvic brim, and two malleable retractors placed above.      The round ligament on the right was then clamped, transected and suture ligated with 0-Vicryl. The posterior leaf of the broad ligament was incised and this was extended laterally. The ureter was identified. The infundibulopelvic ligament was then doubly clamped, transected and suture ligated with 0-Vicryl. Hemostasis was visualized. The anterior leaf of the broad ligament was incised along the bladder reflection to the midline. The bladder was then gently dissected off the lower uterine segment and the cervix via sharp and blunt dissection. The uterine artery was then skeletonized. The same steps were then performed on the left. The uterine arteries were then clamped with Sarah clamps, transected and suture ligated with O-Vicryl. Again, hemostasis was assured. The uterosacral ligaments were clamped on both sides, transected and suture ligated in a similar fashion. The cervicovaginal junction was grasped bilaterally with double tooth tenaculums, and the cervix amputated using steiner scissors. The uterus, cervix, tubes, and ovaries were then  handed off as one specimen for pathology. The vaginal cuff was closed with running locked O-vicryl in one direction and running in reverse. Hemostasis was assured. Next, the incision was extended upward and the bowels carefully examined with no notable disease. A single small nodule was removed with traction and cautery from the right diaphragm and sent for pathology. The omentum was resected from the transverse colon using a combination of monopolar cautery and the bipolar Ligasure, and was sent for pathology.     The pelvis was irrigated copiously with warmed normal saline. All laparotomy sponges and instruments were removed from the abdomen. The facia was closed with running looped O-PDS and hemostasis was assured. The subcuticular layer closed with two layers of 3-0 vicryl. The skin was closed with 4-0 monocryl. Sponge, lap, instrument and needle counts were correct x 2. The patient was taken to the PACU in stable condition.       José Cole MD, MS    Department of Obstetrics and Gynecology   Division of Gynecologic Oncology   [facility]  Phone: [phone]

## 2018-01-03 NOTE — PROGRESS NOTES
Bilateral TAP  block performed without complications.  VSS.  Pt tolerated well.  Will continue to monitor.

## 2018-01-03 NOTE — ANESTHESIA PROCEDURE NOTES
Arterial Line Procedure Note  Staff:     Anesthesiologist:  YUNIER SINGH  Location: In OR After Induction  Procedure Start/Stop Times:     patient identified, IV checked, risks and benefits discussed, informed consent, monitors and equipment checked, pre-op evaluation and at physician/surgeon's request      Correct Patient: Yes      Correct Position: Yes      Correct Site: Yes      Correct Procedure: Yes      Correct Laterality:  Yes    Site Marked:  N/A  Line Placement:     Procedure:  Arterial Line    Insertion Site:  Radial    Insertion laterality:  Left    Skin Prep: Chloraprep      Patient Prep: patient draped, mask, sterile gloves, hat and hand hygiene      Local skin infiltration:  None    Ultrasound Guided?: No      Catheter size:  20 gauge, Quick cath    Dressing:  Tegaderm    Complications:  None obvious    Arterial waveform: Yes      IBP within 10% of NIBP: Yes

## 2018-01-03 NOTE — ANESTHESIA CARE TRANSFER NOTE
Patient: Kymberly Miller    Procedure(s):  Exploratory Laparotomy, Total Abdominal Hysterectomy, Bilateral Salpingo-Oophorectomy, Tumor Debulking, Omentectomy - Wound Class: II-Clean Contaminated    Diagnosis: Preoperative Examination  Diagnosis Additional Information: No value filed.    Anesthesia Type:   General, ETT     Note:  Airway :Face Mask  Patient transferred to:PACU  Handoff Report: Identifed the Patient, Identified the Reponsible Provider, Reviewed the pertinent medical history, Discussed the surgical course, Reviewed Intra-OP anesthesia mangement and issues during anesthesia, Set expectations for post-procedure period and Allowed opportunity for questions and acknowledgement of understanding      Vitals: (Last set prior to Anesthesia Care Transfer)    CRNA VITALS  1/3/2018 1011 - 1/3/2018 1042      1/3/2018             Pulse: 60    SpO2: 100 %    Resp Rate (observed): 16    EKG: AV Paced                Electronically Signed By: WES Fischer CRNA  January 3, 2018  10:42 AM

## 2018-01-03 NOTE — IP AVS SNAPSHOT
MRN:[id number]                      After Visit Summary   1/3/2018    Kymberly Miller    MRN: [id number]           Thank you!     Thank you for choosing [facility] for your care. Our goal is always to provide you with excellent care. Hearing back from our patients is one way we can continue to improve our services. Please take a few minutes to complete the written survey that you may receive in the mail after you visit with us. Thank you!        Patient Information     Date Of Birth          9/30/1933        Designated Caregiver       Most Recent Value    Caregiver    Will someone help with your care after discharge? yes    Name of designated caregiver Sinan (son)    Phone number of caregiver In chart    Caregiver address In chart      About your hospital stay     You were admitted on:  January 3, 2018 You last received care in the:  [address] [facility]    You were discharged on:  January 6, 2018        Reason for your hospital stay       To have surgery                  Who to Call     For medical emergencies, please call 911.  For non-urgent questions about your medical care, please call your primary care provider or clinic, [phone]  For questions related to your surgery, please call your surgery clinic        Attending Provider     Provider Specialty    José Cole MD Obstetrics & Gynecology, Maternal & Fetal Medicine    Dennise Salazar MD Oncology    Dennise Martines MD Surgery       Primary Care Provider Office Phone # Fax #    Katrina Mcclendon MD, [phone]      After Care Instructions     Diet       Follow this diet upon discharge: Regular            Discharge Instructions       GENERAL POST-OPERATIVE  PATIENT INSTRUCTIONS      FOLLOW-UP:    Call Surgeon if you have:  Temperature greater than 100.4  Persistent nausea and vomiting  Severe uncontrolled pain  Redness, tenderness, or signs of infection (pain, swelling, redness, odor or  green/yellow discharge around the site)  Difficulty breathing, headache or visual disturbances  Hives  Persistent dizziness or light-headedness  Extreme fatigue  Any other questions or concerns you may have after discharge    In an emergency, call 911 or go to an Emergency Department at a nearby hospital       WOUND CARE INSTRUCTIONS:  Keep a dry clean dressing on the wound if there is drainage. The initial bandage may be removed after 24 hours.  Once the wound has quit draining you may leave it open to air.  If clothing rubs against the wound or causes irritation and the wound is not draining you may cover it with a dry dressing during the daytime.  Try to keep the wound dry and avoid ointments on the wound unless directed to do so.  If the wound becomes bright red and painful or starts to drain infected material that is not clear, please contact your physician immediately.    1.  You may shower 48 hrs after surgery   2.  No soaking in the tub        DIET:  There are no dietary restrictions.  You may eat any foods that you can tolerate.  It is a good idea to eat a high fiber diet and take in plenty of fluids to prevent constipation.  If you do become constipated you may want to take a mild laxative or take ducolax tablets on a daily basis until your bowel habits are regular.  Constipation can be very uncomfortable, along with straining, after recent surgery.    ACTIVITY:  You are encouraged to cough and deep breath or use your incentive spirometer if you were given one, every 15-30 minutes when awake.  This will help prevent respiratory complications and low grade fevers post-operatively if you had a general anesthetic.  You may want to hug a pillow when coughing and sneezing to add additional support to the surgical area, if you had abdominal or chest surgery, which will decrease pain during these times.       1.  No heavy lifting >20lbs or strenuous exercise for six-eight weeks.  No exercise  in which you are  using core muscles (yoga, pilates, swimming, weight lifting)   2.  You may walk as much as you wish.  You are encouraged to increase your   activity each day after surgery.  Stairs are okay.    3.  Nothing per vagina for eight weeks.  No tampons, no intercourse, no    douching.  You can expect some light vaginal spotting and discharge for up to six   weeks.  If bleeding becomes heavy, please contact the office.     MEDICATIONS:  Try to take narcotic medications and anti-inflammatory medications, such as tylenol, ibuprofen, naprosyn, etc., with food.  This will minimize stomach upset from the medication.  Should you develop nausea and vomiting from the pain medication, or develop a rash, please discontinue the medication and contact your physician.  You should not drive, make important decisions, or operate machinery when taking narcotic pain medication.    OTHER:  Patients are often constipated after general anesthesia and surgery.  The patient should continue to take stool softeners (for example, Senokot-S) for the next six weeks and consume adequate amounts of water.  If the patient remains constipated or unable to pass stool, please try one or all of the following measures:  1.  Milk of Magnesia 30cc twice a day as needed by mouth  2.  Metamucil 2 tablespoons in 12 ounces of fluid  3.  Dulcolax oral or suppositories  4.  Prunes or prune juice  5.  Miralax daily      QUESTIONS:  Please feel free to call your physician or the hospital  if you have any questions, and they will be glad to assist you.                  Follow-up Appointments     Follow Up and recommended labs and tests       Follow-up for post op appointment with Dr. Cole on 1/22/18                  Your next 10 appointments already scheduled     Jan 22, 2018 11:40 AM CST   (Arrive by 11:25 AM)   Post-Op with José Cole MD   [facility] ([facility])    [address] [place] [address]  [address]  [facility] [address]   [phone]              Pending Results     Date and Time Order Name Status Description    1/3/2018 0909 Surgical pathology exam In process             Statement of Approval     Ordered          18 1034  I have reviewed and agree with all the recommendations and orders detailed in this document.  EFFECTIVE NOW     Approved and electronically signed by:  Alfredo Garrido MD             Admission Information     Date & Time Provider Department Dept. Phone    1/3/2018 Dennise Martines MD [address] [facility] [phone]      Your Vitals Were     Blood Pressure Pulse Temperature Respirations Height Weight    109/51 (BP Location: Left arm) 84 97  F (36.1  C) (Oral) 18 1.651 m (5' 5\") 78.7 kg (173 lb 8 oz)    Pulse Oximetry BMI (Body Mass Index)                92% 28.87 kg/m2          [facility] Information     [facility] lets you send messages to your doctor, view your test results, renew your prescriptions, schedule appointments and more. To sign up, go to [email or web address] . Click on \"Log in\" on the left side of the screen, which will take you to the Welcome page. Then click on \"Sign up Now\" on the right side of the page.     You will be asked to enter the access code listed below, as well as some personal information. Please follow the directions to create your username and password.     Your access code is: [email or web address]  Expires: 2018  9:44 AM     Your access code will  in 90 days. If you need help or a new code, please call your [facility] clinic or [phone].        Care EveryWhere ID     This is your Care EveryWhere ID. This could be used by other organizations to access your [facility] medical records  [id number]        Equal Access to Services     MARCY MUSA : Brianna Ayala, ian stanford, qaybta kapeter rebolledo. So [facility] [phone].    ATENCIÓN: Si sebastianla esphannah parra a lozano " disposición servicios gratuitos de asistencia lingüística. Ramos andrew [phone].    We comply with applicable federal civil rights laws and [place] laws. We do not discriminate on the basis of race, color, national origin, age, disability, sex, sexual orientation, or gender identity.               Review of your medicines      START taking        Dose / Directions    enoxaparin 40 MG/0.4ML injection   Commonly known as:  LOVENOX        Dose:  40 mg   Inject 0.4 mLs (40 mg) Subcutaneous every 24 hours for 25 days   Quantity:  10 mL   Refills:  0       oxyCODONE IR 5 MG tablet   Commonly known as:  ROXICODONE        Dose:  5-10 mg   Take 1-2 tablets (5-10 mg) by mouth every 3 hours as needed for moderate to severe pain   Quantity:  30 tablet   Refills:  0       senna-docusate 8.6-50 MG per tablet   Commonly known as:  SENOKOT-S;PERICOLACE        Dose:  1-2 tablet   Take 1-2 tablets by mouth 2 times daily   Quantity:  100 tablet   Refills:  0         CONTINUE these medicines which have NOT CHANGED        Dose / Directions    acetaminophen 325 MG tablet   Commonly known as:  TYLENOL        Dose:  650 mg   Take 650 mg by mouth as needed   Refills:  0       aspirin 81 MG EC tablet        Dose:  81 mg   Take 81 mg by mouth every morning Takes three times a week   Refills:  0       fish Oil 1200 MG capsule        Dose:  1200 mg   Take 1,200 mg by mouth every morning   Refills:  0       furosemide 40 MG tablet   Commonly known as:  LASIX        Dose:  20 mg   Take 20 mg by mouth every morning   Refills:  0       lidocaine-prilocaine cream   Commonly known as:  EMLA        Apply topically as needed for moderate pain   Refills:  0       Multi-vitamin Tabs tablet        Dose:  1 tablet   Take 1 tablet by mouth every morning   Refills:  0       pravastatin 40 MG tablet   Commonly known as:  PRAVACHOL        Dose:  40 mg   Take 40 mg by mouth At Bedtime   Refills:  0       vitamin D 2000 UNITS tablet        Dose:  2000 Units    Take 2,000 Units by mouth every morning   Refills:  0            Where to get your medicines      These medications were sent to [place], [place] - [address]  [address], [facility] [address]    Hours:  TRANSPLANT PHONE NUMBER [phone] Phone:  [phone]     enoxaparin 40 MG/0.4ML injection    senna-docusate 8.6-50 MG per tablet         Some of these will need a paper prescription and others can be bought over the counter. Ask your nurse if you have questions.     Bring a paper prescription for each of these medications     oxyCODONE IR 5 MG tablet                Protect others around you: Learn how to safely use, store and throw away your medicines at www.disposemymeds.org.             Medication List: This is a list of all your medications and when to take them. Check marks below indicate your daily home schedule. Keep this list as a reference.      Medications           Morning Afternoon Evening Bedtime As Needed    acetaminophen 325 MG tablet   Commonly known as:  TYLENOL   Take 650 mg by mouth as needed   Last time this was given:  650 mg on 1/6/2018  8:46 AM                                aspirin 81 MG EC tablet   Take 81 mg by mouth every morning Takes three times a week   Last time this was given:  81 mg on 1/6/2018  8:47 AM                                enoxaparin 40 MG/0.4ML injection   Commonly known as:  LOVENOX   Inject 0.4 mLs (40 mg) Subcutaneous every 24 hours for 25 days   Last time this was given:  40 mg on 1/6/2018  8:52 AM                                fish Oil 1200 MG capsule   Take 1,200 mg by mouth every morning                                furosemide 40 MG tablet   Commonly known as:  LASIX   Take 20 mg by mouth every morning   Last time this was given:  20 mg on 1/6/2018  8:47 AM                                lidocaine-prilocaine cream   Commonly known as:  EMLA   Apply topically as needed for moderate pain                                 Multi-vitamin Tabs tablet   Take 1 tablet by mouth every morning                                oxyCODONE IR 5 MG tablet   Commonly known as:  ROXICODONE   Take 1-2 tablets (5-10 mg) by mouth every 3 hours as needed for moderate to severe pain   Last time this was given:  5 mg on 1/5/2018  4:20 PM                                pravastatin 40 MG tablet   Commonly known as:  PRAVACHOL   Take 40 mg by mouth At Bedtime   Last time this was given:  40 mg on 1/4/2018 10:08 PM                                senna-docusate 8.6-50 MG per tablet   Commonly known as:  SENOKOT-S;PERICOLACE   Take 1-2 tablets by mouth 2 times daily   Last time this was given:  2 tablets on 1/5/2018  7:39 PM                                vitamin D 2000 UNITS tablet   Take 2,000 Units by mouth every morning

## 2018-01-03 NOTE — BRIEF OP NOTE
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   Post-operative diagnosis Same  Procedure: Procedure(s):  Exploratory Laparotomy, Total Abdominal Hysterectomy, Bilateral Salpingo-Oophorectomy, Tumor Debulking, Omentectomy - Wound Class: II-Clean Contaminated  Surgeon: Surgeon(s) and Role:     * José Cole MD - Primary     * Arley Wooten MD - Resident - Assisting     * Nell Hernandez MD - Assisting  Anesthesia: Combined General with Block   Estimated blood loss: 100 ml  Fluids: 1000 ml  UOP: 220 ml  Drains: None  Specimens:   ID Type Source Tests Collected by Time Destination   A : pelvic washings Washings Pelvis CYTOLOGY NON GYN José Cole MD 1/3/2018  8:23 AM    B : posterior cul de sac Tissue Other SURGICAL PATHOLOGY EXAM José Cole MD 1/3/2018  8:32 AM    C : Uterus, Cervix, Bilateral Tubes and Ovaries Tissue Other SURGICAL PATHOLOGY EXAM José Cole MD 1/3/2018  9:07 AM    D : omentum Tissue Omentum SURGICAL PATHOLOGY EXAM José Cole MD 1/3/2018  9:31 AM    E : diaphram nodule Tissue Other SURGICAL PATHOLOGY EXAM José Cole MD 1/3/2018  9:33 AM      Findings: EUA: small midline mobile uterus, no adnexal masses, atrophic vagina. Laparotomy: Normal appearing uterus, tubes, ovaries. Small nodules lining Pouch of Luis A. Thickened omentum without tonny disease. Single small diaphragmatic nodule. Remainder of survey normal.   Complications: None.  Implants: None    Arley Wooten  10:44 AM January 3, 2018

## 2018-01-03 NOTE — H&P
SURGICAL ICU ADMISSION NOTE  January 3, 2018      PRIMARY TEAM: OB/GYN  PRIMARY PHYSICIAN: Douglas    REASON FOR CRITICAL CARE ADMISSION: Post surgical monitoring   ADMITTING PHYSICIAN: Conrad    HISTORY PRESENTING ILLNESS: This is a 84 year old female who is POD#0 s/p exploratory laparotomy with BREA/BSO, Omentectomy, and Tumor debulking secondary to stage IV serous grade ovarian cancer. PMHx significant for pacemaker dependent since 2016, mild non obstructive CAD, mild aortic stenosis, moderate tricuspid and mitral regurgitation, HLD, diastolic HF, and ovarian cancer. Pt has previously undergone 6 cycles of neoadjuvant chemotherapy with carboplatin weekly as well as paclitaxel weekly. Most recent ECHO showed only trivial AS and hyperdynamic LV with EF of 75%. Cath in 2015 showed non obstructing mild coronary artery disease with elevated right heart filling pressure. Per primary surgery team patients operation was uneventful with 100 cc blood loss.    Review of systems: 10 point ROS neg other than the symptoms noted above in the HPI.     PAST MEDICAL HISTORY:   has a past medical history of Ascites; Heart murmur; Hyperlipidemia; Mild aortic stenosis; Moderate mitral regurgitation; Nonobstructive atherosclerosis of coronary artery; Obesity; Ovarian cancer (H); and Pacemaker (06/2016).    PAST SURGICAL HISTORY:   has a past surgical history that includes cataract iol, rt/lt; Cholecystectomy; LEFT HEART CATHETERIZATION (2015); Implant pacemaker (06/03/2016); OTHER SURGICAL HISTORY (06/26/2017); and vascular surgery.    FAMILY HISTORY:  family history includes CEREBROVASCULAR DISEASE in her mother; CEREBROVASCULAR DISEASE (age of onset: 80) in her brother; Chronic Obstructive Pulmonary Disease in her sister; Esophageal Cancer (age of onset: 41) in her brother; HEART DISEASE in her father; Heart Failure in her son; Pacemaker in her son.    SOCIAL HISTORY:   reports that she has never smoked. She has never used  smokeless tobacco. She reports that she drinks about 1.2 - 1.8 oz of alcohol per week  She reports that she does not use illicit drugs.    ALLERGIES:  Allergies   Allergen Reactions     Atorvastatin      Muscle pain        MEDICATIONS:  Prescriptions Prior to Admission   Medication Sig Dispense Refill Last Dose     lidocaine-prilocaine (EMLA) cream Apply topically as needed for moderate pain   1/3/2018 at Unknown time     acetaminophen (TYLENOL) 325 MG tablet Take 650 mg by mouth as needed    Past Week at Unknown time     aspirin 81 MG EC tablet Take 81 mg by mouth every morning Takes three times a week   1/2/2018 at 0800     Cholecalciferol (VITAMIN D) 2000 UNITS tablet Take 2,000 Units by mouth every morning    Past Week at Unknown time     furosemide (LASIX) 40 MG tablet Take 20 mg by mouth every morning    1/2/2018 at 0800     multivitamin, therapeutic with minerals (MULTI-VITAMIN) TABS tablet Take 1 tablet by mouth every morning    Past Week at Unknown time     Omega-3 Fatty Acids (FISH OIL) 1200 MG CAPS Take 1,200 mg by mouth every morning    Past Week at Unknown time     pravastatin (PRAVACHOL) 40 MG tablet Take 40 mg by mouth At Bedtime    1/1/2018 at 2100            PHYSICAL EXAMINATION:  Temp:  [97.8  F (36.6  C)-98.2  F (36.8  C)] 97.8  F (36.6  C)  Pulse:  [85] 85  Heart Rate:  [60-81] 60  Resp:  [12-20] 12  BP: (116-129)/(57-73) 122/63  SpO2:  [95 %-100 %] 100 %    Physical Exam  - Gen: elderly female well appearing, sleepy  - HEENT: MMM, EOMI, PERRL, oropharynx wnl   - CVS: Paced rhythm, regular rate, systolic ejection murmur grade 2/6 noted over left 2nd intercostal space  - Pulm: CTAB, non-labored breathing,  - Abd: soft, appropriately tender, non distended, large midline incision with bandage over it with only small amount of blood noted on dressing. No drains in place  - MSK/Neuro:  - Ext: warm, well-perfused;  No peripheral edema, distal pulses b/l    LABS  CMP  Recent Labs  Lab 01/03/18  0638    POTASSIUM 3.5       CBC  Recent Labs  Lab 01/03/18  0638   HGB 12.2       INR No lab results found in last 7 days.    ASSESSMENT: Kymberly Miller is a 84 year old female POD#0  s/p exploratory laparotomy with BREA/BSO, Omentectomy, and Tumor debulking secondary to stage IV serous grade ovarian cancer. Pt admitted to ICU for overnight monitoring given age and cardiac history.    PLAN:    Neuro/ pain/ sedation:  # Acute postoperative pain  - Monitor neurological status. Notify the MD for any acute changes in exam.  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  - Continuous cardiac monitoring.   - Pacemaker interrogated 12/11/17, magnet used intraoperatively   - Holding PTA Lasix/Statin/ASA    Pulm:   - Supplemental oxygen to keep saturation above 92 %.  - IS q15- 3 mins when awake.    FEN/ GI:   - Advance diet as tolerated  - MIVF LR @100 cc/hr  - ICU electrolyte replacement protocol    :   #S/p Ex-lap with BREA/BSO, omentectomy, and tumor debulking for ovarian cancer  - UOP is adequate  - Hahn catheter to stay in place for strict intake and output monitoring  - Hahn to stay in until POD#1    Endocrine: No acute concerns  - Daily BMP    ID/ Abx: No acute concerns  - No indication for antibiotics  - daily CBC    Heme: Hemoglobin stable.   - CBC  -Transfuse if needed goal hgb>7    Prophylaxis:    - DVT: mechanical  - GI: none    MSK:  - PT/OT    Lines/Drains:  - jasvir, PIV, hahn    Disposition: Surgical ICU    Patient seen, findings and plan discussed with surgical ICU staff, Dr. Martines.    Roland Perez, DO  Anesthesia CA1  Pager: [phone]

## 2018-01-03 NOTE — ANESTHESIA PREPROCEDURE EVALUATION
Anesthesia Evaluation     . Pt has had prior anesthetic. Type: General    No history of anesthetic complications          ROS/MED HX    ENT/Pulmonary:  - neg pulmonary ROS     Neurologic:  - neg neurologic ROS     Cardiovascular:     (+) Dyslipidemia, --CAD, --. : . . . pacemaker Reason placed: 3rd degree heart block:type: [employer] [place] Dual Chamber  settings: DDDR 60-12- - Patient is dependent on pacemaker . valvular problems/murmurs type: MR moderate:. Previous cardiac testing Echodate:6/2016results:Transthoracic echo performed with the patient in third-degree heart block. The left ventricle is normal size. The left ventricular systolic function is hyperdynamic. The ejection fraction was estimated at 75%. There is mild concentric hypertrophy. There was severe left atrial enlargement. There was aortic valve thickening with perhaps trivial stenosis. There was probable mild mitral valve stenosis (see text). There was mild-to-moderate mitral regurgitation. Trace or physiologic pulmonic regurgitation. Moderate tricuspid regurgitation.  The right ventricular systolic pressure was calculated and elevated at 50 mmHg. Small pericardial effusion.  date: results: date: results:Cath date: 2015 results:1. Mild coronary artery disease without flow-limiting obstructions. 2. FFR in the LAD was checked and found to be within normal limits at 0.90, indicating good distal perfusion. 3. Right heart catheterization demonstrates an elevated right heart filling pressure as well as an elevated end-diastolic pressure. This patient may benefit from diuresis. Left ventriculogram shows normal LV function EF estimated 70%. No significant MR and a normal aortic root with thickened aortic valve leaflets.          METS/Exercise Tolerance:  >4 METS   Hematologic:  - neg hematologic  ROS       Musculoskeletal:   (+) arthritis, , , other musculoskeletal- Chronic left knee pain      GI/Hepatic:  - neg GI/hepatic ROS       Renal/Genitourinary:  - ROS  Renal section negative       Endo:         Psychiatric:  - neg psychiatric ROS       Infectious Disease:  - neg infectious disease ROS       Malignancy:   (+) Malignancy History of Other  Other CA Ovarian Cancer Active status post Chemo         Other:    (+) no H/O Chronic Pain,                   Physical Exam  Normal systems: cardiovascular and pulmonary    Airway   Mallampati: III  TM distance: <3 FB  Neck ROM: full    Dental   (+) missing and upper dentures    Cardiovascular   Rhythm and rate: regular and normal      Pulmonary    breath sounds clear to auscultation                 PAC Discussion and Assessment    ASA Classification: 3  Case is suitable for: [place]  Anesthetic techniques and relevant risks discussed: GA  Invasive monitoring and risk discussed:   Types:   Possibility and Risk of blood transfusion discussed:   NPO instructions given:   Additional anesthetic preparation and risks discussed:   Needs early admission to pre-op area:   Other:     PAC Resident/NP Anesthesia Assessment:  Kymberly Miller is an 84 year old female who presents for pre-operative H & P in preparation for an Exploratory laparotomy, BREA/BSO, omentectomy, possible pelvic and para-aortic lymph node dissection, possible bowel surgery, tumor debulking   on 1/3/2018  with Dr. Cole for ovarian cancer.   PAC referral for risk assessment and optimization for anesthesia with comorbid conditions of:    Pre-operative considerations:  1.  Cardiac:  Functional status METS  >4  Risk of Major Adverse Cardiac event: 6.6%  -CAD,  Mod MR, 3rd degree heart block s/p pacemaker 2016   *ECHO 6/2016):  Transthoracic echo performed with the patient in third-degree heart block.  The left ventricle is normal size.  The left ventricular systolic function is hyperdynamic. The ejection fraction was estimated at 75%. There is mild concentric hypertrophy. There was severe left atrial enlargement. There was aortic valve thickening with perhaps trivial  stenosis. There was probable mild mitral valve stenosis (see text). There was mild-to-moderate mitral regurgitation. Trace or physiologic pulmonic regurgitation. Moderate tricuspid regurgitation.  The right ventricular systolic pressure was calculated and elevated at 50 mmHg. Small pericardial effusion.    *CATH 9/2015:  1. Mild coronary artery disease without flow-limiting obstructions. 2. FFR in the LAD was checked and found to be within normal limits at 0.90, indicating good distal perfusion. 3. Right heart catheterization demonstrates an elevated right heart filling pressure as well as an elevated end-diastolic pressure. This patient may benefit from diuresis. Left ventriculogram shows normal LV function EF estimated 70%. No significant MR and a normal aortic root with thickened aortic valve leaflets.   2.  Pulm:   CHRIS risk:  low  3.  GI:  Risk of PONV score =3 .  If > 2, anti-emetic intervention recommended.  4.  Meds:  -Antiplts/Anticoags: asa 81 mg, plan to be determined by surgery team    Patient is optimized and is acceptable candidate for the proposed procedure.  No further diagnostic evaluation is needed.    Patient also evaluated by Dr. Alvarez.    Emilia Burt MS PA-C  12/11/17 6:44 PM        Mid-Level Provider/Resident:   Date:   Time:     Attending Anesthesiologist Anesthesia Assessment:        Anesthesiologist:   Date:   Time:   Pass/Fail:   Disposition:     PAC Pharmacist Assessment:        Pharmacist:   Date:   Time:      Anesthesia Plan      History & Physical Review  History and physical reviewed and following examination; no interval change.    ASA Status:  3 .    NPO Status:  > 8 hours    Plan for General and ETT with Intravenous induction. Maintenance will be Balanced.    PONV prophylaxis:  Ondansetron (or other 5HT-3) and Dexamethasone or Solumedrol  Additional equipment: 2nd IV and Arterial Line      Postoperative Care  Postoperative pain management:  IV analgesics.      Consents  Anesthetic  plan, risks, benefits and alternatives discussed with:  Patient..        I have seen and evaluated the patient myself and agree with the above assessment and plan.  I have explained the risks/benefits of anesthesia to the patient who understands and agrees to proceed.    Katrina Mark MD  Staff Anesthesiologist  Pager [phone]                      .

## 2018-01-03 NOTE — ANESTHESIA POSTPROCEDURE EVALUATION
Patient: Kymberly Miller    Procedure(s):  Exploratory Laparotomy, Total Abdominal Hysterectomy, Bilateral Salpingo-Oophorectomy, Tumor Debulking, Omentectomy - Wound Class: II-Clean Contaminated    Diagnosis:Preoperative Examination  Diagnosis Additional Information: No value filed.    Anesthesia Type:  General, ETT    Note:  Anesthesia Post Evaluation    Patient location during evaluation: PACU  Patient participation: Able to fully participate in evaluation  Level of consciousness: awake  Pain management: adequate  Airway patency: patent  Cardiovascular status: acceptable  Respiratory status: acceptable  Hydration status: acceptable  PONV: none     Anesthetic complications: None          Last vitals:  Vitals:    01/03/18 1210 01/03/18 1215 01/03/18 1230   BP: 114/54 123/56 120/65   Pulse:      Resp: 16 18    Temp:      SpO2: 97% 98% 98%         Electronically Signed By: Katrina Mark MD  January 3, 2018  1:20 PM

## 2018-01-04 LAB
ANION GAP SERPL CALCULATED.3IONS-SCNC: 6 MMOL/L (ref 3–14)
BASOPHILS # BLD AUTO: 0 10E9/L (ref 0–0.2)
BASOPHILS NFR BLD AUTO: 0.1 %
BUN SERPL-MCNC: 8 MG/DL (ref 7–30)
CALCIUM SERPL-MCNC: 8.5 MG/DL (ref 8.5–10.1)
CHLORIDE SERPL-SCNC: 106 MMOL/L (ref 94–109)
CO2 SERPL-SCNC: 29 MMOL/L (ref 20–32)
COPATH REPORT: NORMAL
CREAT SERPL-MCNC: 0.45 MG/DL (ref 0.52–1.04)
DIFFERENTIAL METHOD BLD: ABNORMAL
EOSINOPHIL # BLD AUTO: 0 10E9/L (ref 0–0.7)
EOSINOPHIL NFR BLD AUTO: 0 %
ERYTHROCYTE [DISTWIDTH] IN BLOOD BY AUTOMATED COUNT: 14 % (ref 10–15)
GFR SERPL CREATININE-BSD FRML MDRD: >90 ML/MIN/1.7M2
GLUCOSE SERPL-MCNC: 131 MG/DL (ref 70–99)
HCT VFR BLD AUTO: 35.2 % (ref 35–47)
HGB BLD-MCNC: 11.3 G/DL (ref 11.7–15.7)
IMM GRANULOCYTES # BLD: 0 10E9/L (ref 0–0.4)
IMM GRANULOCYTES NFR BLD: 0.2 %
LACTATE BLD-SCNC: 0.6 MMOL/L (ref 0.7–2)
LYMPHOCYTES # BLD AUTO: 1.4 10E9/L (ref 0.8–5.3)
LYMPHOCYTES NFR BLD AUTO: 11.4 %
MAGNESIUM SERPL-MCNC: 2.3 MG/DL (ref 1.6–2.3)
MCH RBC QN AUTO: 33.1 PG (ref 26.5–33)
MCHC RBC AUTO-ENTMCNC: 32.1 G/DL (ref 31.5–36.5)
MCV RBC AUTO: 103 FL (ref 78–100)
MONOCYTES # BLD AUTO: 1.2 10E9/L (ref 0–1.3)
MONOCYTES NFR BLD AUTO: 9.7 %
NEUTROPHILS # BLD AUTO: 9.6 10E9/L (ref 1.6–8.3)
NEUTROPHILS NFR BLD AUTO: 78.6 %
NRBC # BLD AUTO: 0 10*3/UL
NRBC BLD AUTO-RTO: 0 /100
PHOSPHATE SERPL-MCNC: 2.2 MG/DL (ref 2.5–4.5)
PLATELET # BLD AUTO: 165 10E9/L (ref 150–450)
POTASSIUM SERPL-SCNC: 4.3 MMOL/L (ref 3.4–5.3)
RBC # BLD AUTO: 3.41 10E12/L (ref 3.8–5.2)
SODIUM SERPL-SCNC: 141 MMOL/L (ref 133–144)
WBC # BLD AUTO: 12.2 10E9/L (ref 4–11)

## 2018-01-04 PROCEDURE — 25000132 ZZH RX MED GY IP 250 OP 250 PS 637: Mod: GY | Performed by: PHYSICIAN ASSISTANT

## 2018-01-04 PROCEDURE — 84100 ASSAY OF PHOSPHORUS: CPT | Performed by: STUDENT IN AN ORGANIZED HEALTH CARE EDUCATION/TRAINING PROGRAM

## 2018-01-04 PROCEDURE — 25000132 ZZH RX MED GY IP 250 OP 250 PS 637: Mod: GY | Performed by: STUDENT IN AN ORGANIZED HEALTH CARE EDUCATION/TRAINING PROGRAM

## 2018-01-04 PROCEDURE — 36592 COLLECT BLOOD FROM PICC: CPT | Performed by: SURGERY

## 2018-01-04 PROCEDURE — 25000132 ZZH RX MED GY IP 250 OP 250 PS 637: Mod: GY | Performed by: OBSTETRICS & GYNECOLOGY

## 2018-01-04 PROCEDURE — 85025 COMPLETE CBC W/AUTO DIFF WBC: CPT | Performed by: STUDENT IN AN ORGANIZED HEALTH CARE EDUCATION/TRAINING PROGRAM

## 2018-01-04 PROCEDURE — 83605 ASSAY OF LACTIC ACID: CPT | Performed by: SURGERY

## 2018-01-04 PROCEDURE — A9270 NON-COVERED ITEM OR SERVICE: HCPCS | Mod: GY | Performed by: STUDENT IN AN ORGANIZED HEALTH CARE EDUCATION/TRAINING PROGRAM

## 2018-01-04 PROCEDURE — 99231 SBSQ HOSP IP/OBS SF/LOW 25: CPT | Performed by: NURSE PRACTITIONER

## 2018-01-04 PROCEDURE — 80048 BASIC METABOLIC PNL TOTAL CA: CPT | Performed by: STUDENT IN AN ORGANIZED HEALTH CARE EDUCATION/TRAINING PROGRAM

## 2018-01-04 PROCEDURE — 25000125 ZZHC RX 250: Performed by: STUDENT IN AN ORGANIZED HEALTH CARE EDUCATION/TRAINING PROGRAM

## 2018-01-04 PROCEDURE — 25000128 H RX IP 250 OP 636: Performed by: STUDENT IN AN ORGANIZED HEALTH CARE EDUCATION/TRAINING PROGRAM

## 2018-01-04 PROCEDURE — A9270 NON-COVERED ITEM OR SERVICE: HCPCS | Mod: GY | Performed by: OBSTETRICS & GYNECOLOGY

## 2018-01-04 PROCEDURE — 83735 ASSAY OF MAGNESIUM: CPT | Performed by: STUDENT IN AN ORGANIZED HEALTH CARE EDUCATION/TRAINING PROGRAM

## 2018-01-04 PROCEDURE — 12000008 ZZH R&B INTERMEDIATE UMMC

## 2018-01-04 PROCEDURE — 99233 SBSQ HOSP IP/OBS HIGH 50: CPT | Performed by: PHYSICIAN ASSISTANT

## 2018-01-04 PROCEDURE — 99024 POSTOP FOLLOW-UP VISIT: CPT | Mod: GC | Performed by: OBSTETRICS & GYNECOLOGY

## 2018-01-04 PROCEDURE — 25000128 H RX IP 250 OP 636: Performed by: OBSTETRICS & GYNECOLOGY

## 2018-01-04 PROCEDURE — A9270 NON-COVERED ITEM OR SERVICE: HCPCS | Mod: GY | Performed by: PHYSICIAN ASSISTANT

## 2018-01-04 RX ORDER — FUROSEMIDE 20 MG
20 TABLET ORAL DAILY
Status: DISCONTINUED | OUTPATIENT
Start: 2018-01-04 | End: 2018-01-06 | Stop reason: HOSPADM

## 2018-01-04 RX ORDER — PRAVASTATIN SODIUM 20 MG
40 TABLET ORAL AT BEDTIME
Status: DISCONTINUED | OUTPATIENT
Start: 2018-01-04 | End: 2018-01-05

## 2018-01-04 RX ADMIN — ACETAMINOPHEN 650 MG: 325 TABLET, FILM COATED ORAL at 14:43

## 2018-01-04 RX ADMIN — SENNOSIDES AND DOCUSATE SODIUM 1 TABLET: 8.6; 5 TABLET ORAL at 11:55

## 2018-01-04 RX ADMIN — PRAVASTATIN SODIUM 40 MG: 20 TABLET ORAL at 22:08

## 2018-01-04 RX ADMIN — OXYCODONE HYDROCHLORIDE 5 MG: 5 TABLET ORAL at 21:09

## 2018-01-04 RX ADMIN — ASPIRIN 81 MG: 81 TABLET, COATED ORAL at 08:28

## 2018-01-04 RX ADMIN — ACETAMINOPHEN 650 MG: 325 TABLET, FILM COATED ORAL at 01:24

## 2018-01-04 RX ADMIN — OXYCODONE HYDROCHLORIDE 5 MG: 5 TABLET ORAL at 12:18

## 2018-01-04 RX ADMIN — OXYCODONE HYDROCHLORIDE 5 MG: 5 TABLET ORAL at 18:43

## 2018-01-04 RX ADMIN — ACETAMINOPHEN 650 MG: 325 TABLET, FILM COATED ORAL at 21:09

## 2018-01-04 RX ADMIN — FUROSEMIDE 20 MG: 20 TABLET ORAL at 12:19

## 2018-01-04 RX ADMIN — SENNOSIDES AND DOCUSATE SODIUM 1 TABLET: 8.6; 5 TABLET ORAL at 21:09

## 2018-01-04 RX ADMIN — ACETAMINOPHEN 650 MG: 325 TABLET, FILM COATED ORAL at 08:28

## 2018-01-04 RX ADMIN — ENOXAPARIN SODIUM 40 MG: 40 INJECTION SUBCUTANEOUS at 09:38

## 2018-01-04 RX ADMIN — OXYCODONE HYDROCHLORIDE 5 MG: 5 TABLET ORAL at 05:20

## 2018-01-04 RX ADMIN — POTASSIUM PHOSPHATE, MONOBASIC AND POTASSIUM PHOSPHATE, DIBASIC 15 MMOL: 224; 236 INJECTION, SOLUTION INTRAVENOUS at 09:38

## 2018-01-04 ASSESSMENT — PAIN DESCRIPTION - DESCRIPTORS
DESCRIPTORS: SPASM
DESCRIPTORS: SPASM

## 2018-01-04 NOTE — DISCHARGE SUMMARY
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  - Hx mild aortic stenosis and moderate mitral regurgitation    Procedures:   Exploratory laparotomy, total abdominal hysterectomy, bilateral salpingo-oophorectomy, tumor debulking, omentectomy    Prior to Admission Medications:  Prescriptions Prior to Admission   Medication Sig Dispense Refill Last Dose     lidocaine-prilocaine (EMLA) cream Apply topically as needed for moderate pain   1/3/2018 at Unknown time     acetaminophen (TYLENOL) 325 MG tablet Take 650 mg by mouth as needed    Past Week at Unknown time     aspirin 81 MG EC tablet Take 81 mg by mouth every morning Takes three times a week   1/2/2018 at 0800     Cholecalciferol (VITAMIN D) 2000 UNITS tablet Take 2,000 Units by mouth every morning    Past Week at Unknown time     furosemide (LASIX) 40 MG tablet Take 20 mg by mouth every morning    1/2/2018 at 0800     multivitamin, therapeutic with minerals (MULTI-VITAMIN) TABS tablet Take 1 tablet by mouth every morning    Past Week at Unknown time     Omega-3 Fatty Acids (FISH OIL) 1200 MG CAPS Take 1,200 mg by mouth every morning    Past Week at Unknown time     pravastatin (PRAVACHOL) 40 MG tablet Take 40 mg by mouth At Bedtime    1/1/2018 at 2100     Discharge Medications:     Review of your medicines      START taking       Dose / Directions    enoxaparin 40 MG/0.4ML injection   Commonly known as:  LOVENOX        Dose:  40 mg   Inject 0.4 mLs (40 mg) Subcutaneous every 24 hours for 25 days   Quantity:  10 mL   Refills:  0        oxyCODONE IR 5 MG tablet   Commonly known as:  ROXICODONE        Dose:  5-10 mg   Take 1-2 tablets (5-10 mg) by mouth every 3 hours as needed for moderate to severe pain   Quantity:  30 tablet   Refills:  0       senna-docusate 8.6-50 MG per tablet   Commonly known as:  SENOKOT-S;PERICOLACE        Dose:  1-2 tablet   Take 1-2 tablets by mouth 2 times daily   Quantity:  100 tablet   Refills:  0         CONTINUE these medicines which have NOT CHANGED       Dose / Directions    acetaminophen 325 MG tablet   Commonly known as:  TYLENOL        Dose:  650 mg   Take 650 mg by mouth as needed   Refills:  0       aspirin 81 MG EC tablet        Dose:  81 mg   Take 81 mg by mouth every morning Takes three times a week   Refills:  0       fish Oil 1200 MG capsule        Dose:  1200 mg   Take 1,200 mg by mouth every morning   Refills:  0       furosemide 40 MG tablet   Commonly known as:  LASIX        Dose:  20 mg   Take 20 mg by mouth every morning   Refills:  0       lidocaine-prilocaine cream   Commonly known as:  EMLA        Apply topically as needed for moderate pain   Refills:  0       Multi-vitamin Tabs tablet        Dose:  1 tablet   Take 1 tablet by mouth every morning   Refills:  0       pravastatin 40 MG tablet   Commonly known as:  PRAVACHOL        Dose:  40 mg   Take 40 mg by mouth At Bedtime   Refills:  0       vitamin D 2000 UNITS tablet        Dose:  2000 Units   Take 2,000 Units by mouth every morning   Refills:  0            Where to get your medicines      These medications were sent to [facility] [place], [place] - [address] [facility] [address] [facility] [address]    Hours:  TRANSPLANT PHONE NUMBER [phone] Phone:  [phone]      enoxaparin 40 MG/0.4ML injection     senna-docusate 8.6-50 MG per tablet         Some of these will need a paper prescription and others can be bought over the counter. Ask your nurse if you have questions.     Bring a paper  prescription for each of these medications      oxyCODONE IR 5 MG tablet           Consultations:   OT/PT    Brief History of Illness:  Kymberly Miller is a 84 year old woman with a diagnosis of stage IV high-grade serous ovarian carcinoma s/p 6 cycles of neoadjuvant chemotherapy. Surgical management has been recommended including exploratory laparotomy, total abdominal hysterectomy, BSO, omentectomy, and possible debulking.     Hospital Course:  Dz:   -  Stage IV high-grade serous ovarian carcinoma s/p 6 cycles of neoadjuvant chemotherapy. Patient will follow up in clinic with Dr Cole for post op visit and review of final pathology which was not available at discharge but is as follows. This will be reviewed in clinic with patient.   FINAL DIAGNOSIS:   A. NO SPECIMEN RECEIVED:   - No diagnosis given     B. PERITONEUM, POSTERIOR CUL-DE-SAC, BIOPSY:   - Calcified nodule   - No viable tumor cells identified     C. UTERUS, CERVIX, BILATERAL FALLOPIAN TUBES AND OVARIES, HYSTERECTOMY AND    SALPINGO-OOPHORECTOMY:   - Atrophic cystic endometrium   - Myometrium with no significant histologic abnormality   - Uterine serosal fibrous adhesions and calcification, no viable tumor   cells identified   - Cervix with nabothian cysts and focal chronic inflammation   - Right and Left ovaries with residual microscopic foci of high grade   serous carcinoma, chemotherapy related   changes and surface calcifications   - Right and Left fallopian tubes invaded by high grade serous carcinoma,   adjacent to fimbria, chemotherapy   related changes and calcifications   - Incidental adrenal cortical rest in the left fallopian tube, 2 mm     D. OMENTUM, RESECTION:   - High grade serous carcinoma, several 1 mm foci, and calcifications     E. DIAPHRAGM, NODULE, BIOPSY:   - Fibrous tissue with calcifications   - No viable tumor cells identified  FEN:   - She was maintained on IVF until POD#1, when her diet was slowly advanced.  By discharge,  she was tolerating a regular diet without nausea and vomiting and able to maintain her hydration without IVF supplementation.  Pain:   - Her pain was initially controlled with PO pain meds which she tolerated without issue. Her pain was well controlled on this and she was discharged home with these medications.  CV:   - She has a history dependent pacemaker placed in 2016 for 3rd degree heart block, mild aortic stenosis and moderate mitral regurgitation. She was placed on continuous telemetry which inpatient without any abnormal rhythms detected. Resume home ASA and Lasix. Resume home pravastatin on discharge.  Her vital signs were stable while in house and she had no acute CV issues.  PULM:   - She has no history of pulmonary issues.  She was initially given O2 supplementation in to maintain her O2 sats in the immediate postop period and was transitioned off of this without difficulty.  By discharge, her O2 sats were greater than 94% on RA.  She was encouraged to use her bedside IS while in house.  She had no acute pulmonary issues while in house.  HEME:   - Her preoperative Hgb was 12.7.  Her hgb dropped to 10.3 postoperatively and was stable at the time of discharge.  She had no other acute heme issues while in house.  GI:   - She was made NPO prior to the procedure.  On POD#0, her diet was advanced slowly as tolerated.  At the time of discharge, she was tolerating a regular diet without nausea and vomiting.  She will be discharged with a bowel regimen to prevent constipation in the postoperative period.  She had no acute GI issues while in house.  :    - A hahn catheter was placed at the time of the surgery.  Once ambulating unassisted, the hahn catheter was removed.  Prior to discharge, the patient was voiding spontaneously without difficulty.  She had no acute  issues while in house.  ID:   - The patient was AF during her hospitalization.  She received standard preoperative antibiotics without incident.     ENDO:   - no acute issues  PSYCH/NEURO:   - no acute issues  PPX:    - She was given SCDs, IS, and lovenox during her hospital course.  She tolerated these prophylactic interventions without incident.  They were discontinued at the time of her discharge.    Discharge Instructions and Follow up:  Ms. Kymberly Miller was discharged from the hospital with follow up for     Discharge Diet: Regular  Discharge Activity: Activity as tolerated  Discharge Follow up: Post op with Dr. Cole 1/22/18    Discharge Disposition:  Discharged to home    Discharge Staff: MD Alfredo Rizzo MD  OB/GYN Resident, PGY-2  1/6/2018 10:35 AM         Dennise Salazar MD    Department of Ob/Gyn and Women's Health  Division of Gynecologic Oncology  [facility]  [phone]    I saw and evaluated the patient with the resident.  I edited and reviewed the above note.

## 2018-01-04 NOTE — PROGRESS NOTES
Gynecology Oncology Progress Note  18    84 year old POD#1 s/p XL, BREA, BSO, omentectomy for stage IV serous ovarian carcinoma, s/p 6 cycles neoadjuvent carbo/taxel    24 hour events: no cardiac issues overnight, no acute events    Subjective: Patient is feeling overall well, some mild abdominal cramping.  Tolerated light meal last night, no N/V, no F/S/C, no lightheadedness.     Objective:   Temp: 98.9  F (37.2  C) Temp  Min: 97.6  F (36.4  C)  Max: 99  F (37.2  C)  Resp: 14 Resp  Min: 10  Max: 20  SpO2: 97 % SpO2  Min: 91 %  Max: 100 %  Pulse: 85 No Data Recorded  Heart Rate: 87 Heart Rate  Min: 60  Max: 95  BP: 124/61 Systolic (24hrs), Av , Min:110 , Max:129   Diastolic (24hrs), Av, Min:54, Max:86    General: alert and oriented, in NAD  CV: regular rate and rhythm  Resp: clear bilaterally  Abdomen: soft, non tender, non distended  Incision: Sterile bandage in place with minimal shadowing   Extremities: nontender, trace edema, SCDs in place    I/O last 3 completed shifts:  In: 3048.33 [P.O.:900; I.V.:2148.33]  Out: 2300 [Urine:2200; Blood:100]     ROUTINE IP LABS (Last four results)  BMP    Recent Labs  Lab 18  0354 18  1450 18  0638    137  --    POTASSIUM 4.3 3.9 3.5   CHLORIDE 106 105  --    VICKIE 8.5 8.7  --    CO2 29 24  --    BUN 8 12  --    CR 0.45* 0.48*  --    * 149*  --      CBC    Recent Labs  Lab 18  0354 18  1450 18  0638   WBC 12.2* 15.1*  --    RBC 3.41* 3.77*  --    HGB 11.3* 12.7 12.2   HCT 35.2 38.8  --    * 103*  --    MCH 33.1* 33.7*  --    MCHC 32.1 32.7  --    RDW 14.0 13.9  --     170  --      INR    Recent Labs  Lab 18  1450   INR 0.96     Assessment:  84 year old F POD#1 s/p XL, BREA, BSO, omentectomy for stage IV serous ovarian carcinoma, s/p 6 cycles neoadjuvent carbo/taxel     Plan:  FEN: ADAT, will discontinue  today  Pain: tylenol, Cristy, and Dilaudid; s/p TAP blocks  Heme: Hgb 12.2>> 11.3  CV:  Dependent Pacemaker (placed 2016 for 3rd deg block), mild Ao stenosis, mod mitral regurg. No cardiac issues overnight. On ASA 81, Lasix held, pravastatin held. Will transfer to Tele bed   Pulm: NI  GI: Stool softeners, PRN antiemetics.   : Mejia in, remove today  ID: s/p perioperative Abx, NI  Psych/Neuro: NI  PPX: SCDs, start ppx lovenox today  Dispo: Transfer to Tele bed. Home pending meeting postop goals    Arley Wooten  7:07 AM January 4, 2018       Dennise Salazar MD    Department of Ob/Gyn and Women's Health  Division of Gynecologic Oncology  [facility]  [phone]    I saw and evaluated the patient with the fellow. I edited and reviewed the above note.  Plan transfer to Telemetry unit today as was in PACU for monitoring overnight. Doing well this am. lovenox prophylaxis planned today as Hgb stable. Monitor cardiac status closely. Will restrict IVF so as to not fluid overload her.    Lab Results   Component Value Date    WBC 12.2 01/04/2018     Lab Results   Component Value Date    RBC 3.41 01/04/2018     Lab Results   Component Value Date    HGB 11.3 01/04/2018     Lab Results   Component Value Date    HCT 35.2 01/04/2018     Lab Results   Component Value Date     01/04/2018     Lab Results   Component Value Date    MCH 33.1 01/04/2018     Lab Results   Component Value Date    MCHC 32.1 01/04/2018     Lab Results   Component Value Date    RDW 14.0 01/04/2018     Lab Results   Component Value Date     01/04/2018       Last Basic Metabolic Panel:  Lab Results   Component Value Date     01/04/2018      Lab Results   Component Value Date    POTASSIUM 4.3 01/04/2018     Lab Results   Component Value Date    CHLORIDE 106 01/04/2018     Lab Results   Component Value Date    VICKIE 8.5 01/04/2018     Lab Results   Component Value Date    CO2 29 01/04/2018     Lab Results   Component Value Date    BUN 8 01/04/2018     Lab Results   Component  Value Date    CR 0.45 01/04/2018     Lab Results   Component Value Date     01/04/2018

## 2018-01-04 NOTE — PROGRESS NOTES
"Post-Op Check      Kymberly Miller is a 84 year old POD#0 s/p XL, BREA, BSO, omentectomy for stage IV serous ovarian carcinoma, s/p 6 cycles neoadjuvent carbo/taxel.    S: In PACU boarding bed as SICU is full at this time. Admit to SICU for observation in immediate postop period d/t complex cardiac history. Pt doing well with pain well controlled with tylenol and oxycodone. Denies any nausea, shortness of breath and chest pain. Tolerating juice, water, and ice cream without nausea or vomiting. Not yet ambulated.     O:    /61  Pulse 85  Temp 99  F (37.2  C) (Oral)  Resp 14  Ht 1.651 m (5' 5\")  Wt 78.7 kg (173 lb 8 oz)  SpO2 95%  BMI 28.87 kg/m2    I/O last 3 completed shifts:  In: 1000 [I.V.:1000]  Out: 505 [Urine:405; Blood:100]    General:  A&Ox3, NAD  CV:  RRR, grade II/VI systolic ejection murmur  Pulm:  CTAB, good inspiratory effort  Abd: soft, appropriately tender to palpation, nondistended.  No rebound or guarding  Incision: midline vertical incision w/ overlying dressing with small amount of dry serosanguinous drainage. No surrounding erythema. Abdomen soft, non-distended.  Hahn: in place draining yellow urine  Lines: PIV, hahn, chest port  LE: no edema, no calf tenderness    A:  84 year old F POD#0 s/p XL, BREA, BSO, omentectomy for stage IV serous ovarian carcinoma, s/p 6 cycles neoadjuvent carbo/taxel    P:  FEN: ADAT,  ml/h  Pain: tylenol, Cristy, and Dilaudid; s/p TAP blocks  Heme: Hgb 12.2>> AM level ordered  CV: Dependent Pacemaker (placed 2016 for 3rd deg block), mild Ao stenosis, mod mitral regurg, on ASA 81, Lasix held, pravastatin held  Pulm: NI  GI: Stool softeners, PRN antiemetics.   : Hahn in   ID: s/p perioperative Abx, NI  Psych/Neuro: NI  PPX: SCDs, plan start ppx lovenox POD#1  Dispo: Transfer to  in AM. Home pending meeting postop goals    Alfredo Garrido MD  OB/GYN Resident, PGY-2  1/3/2018 7:12 PM       Dennise Salazar MD    Department of Ob/Gyn " and Women's Health  Division of Gynecologic Oncology  [facility]  [phone]    I saw and evaluated the patient with the fellow. I edited and reviewed the above note.

## 2018-01-04 NOTE — PLAN OF CARE
"Problem: Patient Care Overview  Goal: Plan of Care/Patient Progress Review  Outcome: Improving  D: 84 F POD 0 s/p ex-lap, total abominal hysterectomym, bilateral salpingo-oophorectomy, tumor debulking, omnectomy  I/A:Pt transferred to  as a boarder in PACU at approx 1330. VSS. Afebrile. AOx3. Neuro intact besides numbness in both feet which is baseline per the patient. Received 5mg of oxycodone for pain. 1L NC. CTA.  Tmax 99. Hemodynamically stable. Mejia d/t gyn/onc surgery. UOP 30-50cc/hr. Tolerated toast, icecream and clear liquids this evening. Denies nausea but \"but feels like she needs to burp\". Skin intact besides abdominal incision which has minimal unchanged dried drainage. Replaced potassium and magnesium per protocol.      P: Pt to remain in ICU overnight d/t extensive cardiac history. Pt possibly transfer to floor tomorrow.       "

## 2018-01-04 NOTE — PROGRESS NOTES
SURGICAL ICU PROGRESS NOTE  January 4, 2018      ASSESSMENT: Kymberly Miller is a 84 year old female s/p exploratory laparotomy with BREA/BSO, Omentectomy, and Tumor debulking secondary to stage IV serous grade ovarian cancer on 1/3/18. Pt admitted to ICU for overnight monitoring given age and cardiac history.    Changes:  - TKO IV fluids  - PTA lasix to be resumed   - Regular diet today  - Transfer to floor--telemetry bed, ob/gyn primary    PLAN:  Neuro/ pain/ sedation:  # Acute postoperative pain  - Pain Meds: Oxycodone and IV dilaudid available--minimal use. Tylenol 650mg Every  6 hours.   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 Telemetry   - Pacemaker interrogated 12/11/17   - resume PTA Lasix, Statin, and ASA     Pulm:   # no issues   - Supplemental oxygen to keep saturation above 92 %.  - IS q15- 3 mins when awake.     FEN/ GI:   # constipation, hx of   - Advance diet as tolerated. Regular diet   - MIVF LR @ 10cc/hr   - ICU electrolyte replacement protocol  - bowel regimen.      :   #S/p Ex-lap with BREA/BSO, omentectomy, and tumor debulking for ovarian cancer 1/3/18  - wound cares per Gyn/OV      Endocrine: No acute concerns  - Daily BMP. Monitor for stress hyperglycemia. Goal BG < 160 for optimal wound healing.      ID/ Abx: No acute concerns  - No indication for antibiotics. S/p perioperative antibiotics   - daily CBC     Heme:   - Hemoglobin stable.   - CBC daily   -Transfuse if needed goal hgb>7. Monitor for signs and symptom of bleeding.      General cares and Prophylaxis:    - DVT: mechanical and Lovenox   - GI: none     MSK:   # concern for deconditioning and weakness   - PT/OT     Lines/Drains:  PIV, Port-a-cath       Disposition:   - transfer to telemetry floor      Patient seen, findings and plan discussed with surgical ICU staff, Dr. Martines.    Time spent on this Encounter   Billing:  I spent 30 minutes bedside and on the inpatient unit today managing the  care of Kymberly Miller in relation to the issues listed in this note.    Jeremiah Harkins PA-C     ====================================  SUBJECTIVE:  Course reviewed. No acute events overnight.  Some abdominal cramping after bowel medications this am but denies pain per se. Denies flatus, nausea or vomiting. Denies fever, chills or sweats, denies chest pain.      OBJECTIVE:   1. VITAL SIGNS:   Temp:  [97.6  F (36.4  C)-99  F (37.2  C)] 98.6  F (37  C)  Heart Rate:  [60-95] 89  Resp:  [10-18] 16  BP: (110-134)/(54-87) 134/87  MAP:  [68 mmHg-102 mmHg] 102 mmHg  Arterial Line BP: (103-158)/(44-65) 158/65  SpO2:  [91 %-100 %] 96 %  Resp: 16    2. INTAKE/ OUTPUT:   I/O last 3 completed shifts:  In: 3048.33 [P.O.:900; I.V.:2148.33]  Out: 2300 [Urine:2200; Blood:100]    3. PHYSICAL EXAMINATION:   General: sitting up in bed,  Awake, alert, pleasant   HEENT: OP clear, tongue midline   Neuro: A&Ox3..   Resp: Breathing non-labored on 1L NC, no crackles   CV: RRR,   Monitor: fully paced   Abdomen: abdomen soft and compressible. Mild tenderness along incision sites. Abdomen binder present   Incisions:  midline incision--dressed, some shadowing on dressing--marked   Extremities: warm and well perfused, calves soft and non-tender. PP 2+.     4. INVESTIGATIONS:   Arterial Blood Gases   No lab results found in last 7 days.  Complete Blood Count     Recent Labs  Lab 01/04/18  0354 01/03/18  1450 01/03/18  0638   WBC 12.2* 15.1*  --    HGB 11.3* 12.7 12.2    170  --      Basic Metabolic Panel    Recent Labs  Lab 01/04/18  0354 01/03/18  1450 01/03/18  0638    137  --    POTASSIUM 4.3 3.9 3.5   CHLORIDE 106 105  --    CO2 29 24  --    BUN 8 12  --    CR 0.45* 0.48*  --    GLC  131* 149*  --      Liver Function Tests    Recent Labs  Lab 01/03/18  1450   AST 63*   ALT 50   ALKPHOS 70   BILITOTAL 0.5   ALBUMIN 3.2*   INR 0.96     Pancreatic Enzymes  No lab results found in last 7 days.  Coagulation Profile    Recent Labs  Lab 01/03/18  1450   INR 0.96     Lactate  Invalid input(s): LACTATE    5. RADIOLOGY:   Recent Results (from the past 24 hour(s))   XR Chest 1 View    Narrative    Exam: XR CHEST 1 VW, 1/3/2018 1:55 PM    Indication: post procedural;     Comparison: Correlations made with CT dated 12/11/2017    Findings:   Single portable view of the chest.  There is a right-sided Port-A-Cath terminating over the superior SVC.  There is a left-sided pacemaker with associated leads in place. The  cardiomediastinum and upper abdomen are unremarkable.    There is a left-sided pleural effusion. There are streaky bibasilar  linear opacities. No pneumothorax.      Impression:   1. Support devices stable as listed above.  2. Small left pleural effusion. Subsegmental atelectasis at the left lung base.    I have personally reviewed the examination and initial interpretation and I agree with the findings.    DANAY MARTINEZ MD       =========================================

## 2018-01-04 NOTE — PROGRESS NOTES
"REGIONAL ANESTHESIA PAIN SERVICE  SUBJECTIVE: Pt reports pain manageable with nerve block injections, current opioid/nonopioid analgesics and only has taken two dose oxycodone 5 mg postop.  Pt reports she has minimal pain at rest, and soreness with activity OOB. States she is anxious to be transferred upstairs, tolerating regular diet,  denies nausea.  Denies any weakness, paresthesias, circumoral numbness, metallic taste or tinnitus.        Medications related to Pain Management (Future)    Start     Dose/Rate Route Frequency Ordered Stop    01/04/18 0800  aspirin EC EC tablet 81 mg      81 mg Oral EVERY MORNING 01/03/18 1050      01/03/18 2000  senna-docusate (SENOKOT-S;PERICOLACE) 8.6-50 MG per tablet 1-2 tablet      1-2 tablet Oral 2 TIMES DAILY 01/03/18 1255      01/03/18 1300  acetaminophen (TYLENOL) tablet 650 mg      650 mg Oral EVERY 6 HOURS 01/03/18 1255      01/03/18 1300  bisacodyl (DULCOLAX) Suppository 10 mg      10 mg Rectal DAILY 01/03/18 1255      01/03/18 1255  lidocaine 1 % 1 mL      1 mL Other EVERY 1 HOUR PRN 01/03/18 1255      01/03/18 1255  lidocaine (LMX4) kit       Topical EVERY 1 HOUR PRN 01/03/18 1255      01/03/18 1255  HYDROmorphone (DILAUDID) injection 0.3-0.5 mg      0.3-0.5 mg Intravenous EVERY 1 HOUR PRN 01/03/18 1255      01/03/18 1255  oxyCODONE IR (ROXICODONE) tablet 5-10 mg      5-10 mg Oral EVERY 3 HOURS PRN 01/03/18 1255      01/03/18 0613  bupivacaine liposome (EXPAREL) LONG ACTING injection was administered into the infiltration site to produce postsurgical analgesia. Duration of action is up to 72 hours, and other \"kiet\" medications should not be given for 96 hours with the exception of the lidocaine 5% patch (LIDODERM) and the lidocaine 10mg in potassium infusions. This entry is for INFORMATION ONLY.       Does not apply CONTINUOUS PRN 01/03/18 0613 01/07/18 0612          OBJECTIVE:    Blood pressure 128/65, pulse 85, temperature 98.2  F (36.8  C), temperature source Oral, " "resp. rate 16, height 1.651 m (5' 5\"), weight 78.7 kg (173 lb 8 oz), SpO2 97 %, not currently breastfeeding.  Strength 5/5 and grossly symmetric BLE       ASSESSMENT/PLAN:    Kymberly Miller is a 84 year old female with stage IV serous grade ovarian cancer, now POD #1 s/p  COMBINED HYSTERECTOMY TOTAL ABDOMINAL, SALPINGO-OOPHORECTOMY, NODE DISSECTION, TUMOR DEBULKING with single shot bilateral transversus abdominis plane (TAP) nerve block injections.  Total bupivacaine 0.25% 20 mL, then liposomal bupivacaine (Exparel) 1.3% 20mL administered 1/3/18 for postop pain control.  Pt is ambulating without difficulty.  No weakness or paresthesias.  No evidence of adverse side effects associated with nerve block injections.  Pt acheiving adequate pain control, with nerve block and oral analgesics.  Anticipate up to 72 hours of pain control with long-acting local anesthetic. Additionally, pt will continue to require opioid/nonopioid analgesics for visceral and muscle pain not controlled with local anesthetic.      - NO other local anesthetic use within 96 hours of liposomal bupivacaine (Exparel)  - patient received verbal and written instructions about liposomal bupivacaine and counseling about pharmacologic and nonpharmacologic measures for acute postoperative pain management  - please call if questions or concerns      [facility]  Regional Anesthesia Pain Service  1/4/2018 12:30 PM    Contact Info (for in-house use only):  Job code ID: [id number]   [facility] [id number]  Peds [id number]  [employer] phone: dial [phone], enter jobcode ID, then enter call-back number.    Text: Use [employer] on the Intranet <Paging/Directory> tab and enter Jobcode ID.   If no call back at any time, contact the hospital  and ask for RAPS attending or backup     "

## 2018-01-04 NOTE — PLAN OF CARE
Problem: Patient Care Overview  Goal: Plan of Care/Patient Progress Review  Outcome: Improving  POD1 Exploratory Laparotomy, Total Abdominal Hysterectomy, Bilateral Salpingo-Oophorectomy, Tumor Debulking, Omentectomy. A&Ox4. PERRLA. Baseline numbness to feet. Afebrile. Patient is AV paced . BP stable. Lungs are clear on 1L. Abdomen soft. Denies flatus. Complains of gas discomfort to abdomen. Abdominal binder in place. Midline dressing with dried drainage. Stood at bedside and took a few steps with 1 assist. Oxycodone given x2 and scheduled tylenol given. Mejia removed at 0600 this morning. Urine output over night adequate.     Plan to transfer to  per GYN MD.

## 2018-01-05 ENCOUNTER — APPOINTMENT (OUTPATIENT)
Dept: PHYSICAL THERAPY | Facility: CLINIC | Age: 83
DRG: 737 | End: 2018-01-05
Attending: OBSTETRICS & GYNECOLOGY
Payer: MEDICARE

## 2018-01-05 ENCOUNTER — APPOINTMENT (OUTPATIENT)
Dept: OCCUPATIONAL THERAPY | Facility: CLINIC | Age: 83
DRG: 737 | End: 2018-01-05
Attending: OBSTETRICS & GYNECOLOGY
Payer: MEDICARE

## 2018-01-05 LAB
ERYTHROCYTE [DISTWIDTH] IN BLOOD BY AUTOMATED COUNT: 13.9 % (ref 10–15)
HCT VFR BLD AUTO: 32.9 % (ref 35–47)
HGB BLD-MCNC: 10.3 G/DL (ref 11.7–15.7)
MCH RBC QN AUTO: 33.1 PG (ref 26.5–33)
MCHC RBC AUTO-ENTMCNC: 31.3 G/DL (ref 31.5–36.5)
MCV RBC AUTO: 106 FL (ref 78–100)
PLATELET # BLD AUTO: 166 10E9/L (ref 150–450)
RBC # BLD AUTO: 3.11 10E12/L (ref 3.8–5.2)
WBC # BLD AUTO: 9 10E9/L (ref 4–11)

## 2018-01-05 PROCEDURE — A9270 NON-COVERED ITEM OR SERVICE: HCPCS | Mod: GY | Performed by: STUDENT IN AN ORGANIZED HEALTH CARE EDUCATION/TRAINING PROGRAM

## 2018-01-05 PROCEDURE — 12000008 ZZH R&B INTERMEDIATE UMMC

## 2018-01-05 PROCEDURE — 99024 POSTOP FOLLOW-UP VISIT: CPT | Mod: GC | Performed by: OBSTETRICS & GYNECOLOGY

## 2018-01-05 PROCEDURE — 97530 THERAPEUTIC ACTIVITIES: CPT | Mod: GP | Performed by: PHYSICAL THERAPIST

## 2018-01-05 PROCEDURE — 25000132 ZZH RX MED GY IP 250 OP 250 PS 637: Mod: GY | Performed by: STUDENT IN AN ORGANIZED HEALTH CARE EDUCATION/TRAINING PROGRAM

## 2018-01-05 PROCEDURE — 36592 COLLECT BLOOD FROM PICC: CPT | Performed by: STUDENT IN AN ORGANIZED HEALTH CARE EDUCATION/TRAINING PROGRAM

## 2018-01-05 PROCEDURE — 97165 OT EVAL LOW COMPLEX 30 MIN: CPT | Mod: GO

## 2018-01-05 PROCEDURE — 25000128 H RX IP 250 OP 636: Performed by: OBSTETRICS & GYNECOLOGY

## 2018-01-05 PROCEDURE — 40000133 ZZH STATISTIC OT WARD VISIT

## 2018-01-05 PROCEDURE — 97162 PT EVAL MOD COMPLEX 30 MIN: CPT | Mod: GP | Performed by: PHYSICAL THERAPIST

## 2018-01-05 PROCEDURE — 85027 COMPLETE CBC AUTOMATED: CPT | Performed by: STUDENT IN AN ORGANIZED HEALTH CARE EDUCATION/TRAINING PROGRAM

## 2018-01-05 PROCEDURE — A9270 NON-COVERED ITEM OR SERVICE: HCPCS | Mod: GY | Performed by: OBSTETRICS & GYNECOLOGY

## 2018-01-05 PROCEDURE — 40000193 ZZH STATISTIC PT WARD VISIT: Performed by: PHYSICAL THERAPIST

## 2018-01-05 PROCEDURE — 97535 SELF CARE MNGMENT TRAINING: CPT | Mod: GO

## 2018-01-05 PROCEDURE — 97116 GAIT TRAINING THERAPY: CPT | Mod: GP | Performed by: PHYSICAL THERAPIST

## 2018-01-05 PROCEDURE — 25000132 ZZH RX MED GY IP 250 OP 250 PS 637: Mod: GY | Performed by: OBSTETRICS & GYNECOLOGY

## 2018-01-05 RX ORDER — OXYCODONE HYDROCHLORIDE 5 MG/1
5-10 TABLET ORAL
Qty: 30 TABLET | Refills: 0 | Status: SHIPPED | OUTPATIENT
Start: 2018-01-05

## 2018-01-05 RX ORDER — POLYETHYLENE GLYCOL 3350 17 G/17G
17 POWDER, FOR SOLUTION ORAL DAILY
Status: DISCONTINUED | OUTPATIENT
Start: 2018-01-05 | End: 2018-01-06 | Stop reason: HOSPADM

## 2018-01-05 RX ORDER — AMOXICILLIN 250 MG
1-2 CAPSULE ORAL 2 TIMES DAILY
Qty: 100 TABLET | Refills: 0 | Status: SHIPPED | OUTPATIENT
Start: 2018-01-06

## 2018-01-05 RX ORDER — SIMETHICONE 80 MG
80 TABLET,CHEWABLE ORAL 4 TIMES DAILY
Status: DISCONTINUED | OUTPATIENT
Start: 2018-01-05 | End: 2018-01-06 | Stop reason: HOSPADM

## 2018-01-05 RX ADMIN — BISACODYL 10 MG: 10 SUPPOSITORY RECTAL at 08:19

## 2018-01-05 RX ADMIN — ACETAMINOPHEN 650 MG: 325 TABLET, FILM COATED ORAL at 19:39

## 2018-01-05 RX ADMIN — OXYCODONE HYDROCHLORIDE 5 MG: 5 TABLET ORAL at 16:20

## 2018-01-05 RX ADMIN — ACETAMINOPHEN 650 MG: 325 TABLET, FILM COATED ORAL at 14:00

## 2018-01-05 RX ADMIN — SIMETHICONE CHEW TAB 80 MG 80 MG: 80 TABLET ORAL at 12:25

## 2018-01-05 RX ADMIN — OXYCODONE HYDROCHLORIDE 5 MG: 5 TABLET ORAL at 08:28

## 2018-01-05 RX ADMIN — ASPIRIN 81 MG: 81 TABLET, COATED ORAL at 08:19

## 2018-01-05 RX ADMIN — SIMETHICONE CHEW TAB 80 MG 80 MG: 80 TABLET ORAL at 15:51

## 2018-01-05 RX ADMIN — FUROSEMIDE 20 MG: 20 TABLET ORAL at 08:19

## 2018-01-05 RX ADMIN — ENOXAPARIN SODIUM 40 MG: 40 INJECTION SUBCUTANEOUS at 08:19

## 2018-01-05 RX ADMIN — ACETAMINOPHEN 650 MG: 325 TABLET, FILM COATED ORAL at 02:10

## 2018-01-05 RX ADMIN — SIMETHICONE CHEW TAB 80 MG 80 MG: 80 TABLET ORAL at 19:39

## 2018-01-05 RX ADMIN — SENNOSIDES AND DOCUSATE SODIUM 2 TABLET: 8.6; 5 TABLET ORAL at 19:39

## 2018-01-05 RX ADMIN — ACETAMINOPHEN 650 MG: 325 TABLET, FILM COATED ORAL at 08:19

## 2018-01-05 RX ADMIN — POLYETHYLENE GLYCOL 3350 17 G: 17 POWDER, FOR SOLUTION ORAL at 08:19

## 2018-01-05 RX ADMIN — SENNOSIDES AND DOCUSATE SODIUM 2 TABLET: 8.6; 5 TABLET ORAL at 08:19

## 2018-01-05 RX ADMIN — OXYCODONE HYDROCHLORIDE 5 MG: 5 TABLET ORAL at 06:24

## 2018-01-05 RX ADMIN — OXYCODONE HYDROCHLORIDE 5 MG: 5 TABLET ORAL at 12:25

## 2018-01-05 RX ADMIN — SIMETHICONE CHEW TAB 80 MG 80 MG: 80 TABLET ORAL at 08:19

## 2018-01-05 ASSESSMENT — PAIN DESCRIPTION - DESCRIPTORS
DESCRIPTORS: CRAMPING
DESCRIPTORS: CRAMPING;SORE

## 2018-01-05 NOTE — PLAN OF CARE
Problem: Pain, Acute (Adult)  Goal: Identify Related Risk Factors and Signs and Symptoms  Related risk factors and signs and symptoms are identified upon initiation of Human Response Clinical Practice Guideline (CPG).   D. Up out of bed moves well with minimal assist- C/o pain but refuses pain meds - moves well up to commode  A stable ready to transfer  I report called- transfer to [place].

## 2018-01-05 NOTE — PLAN OF CARE
Problem: Patient Care Overview  Goal: Plan of Care/Patient Progress Review  Outcome: No Change  Pt AO. VSS on RA. Up with SBA + walker. Sat in chair and ambulated halls x2. Pt c/o abd cramping, managed with PRN and scheduled meds. Hypoactive BS. No nausea. Tolerating fair PO. Abd binder in place. Abd drsg removed by MD this AM, steri-strips intact. Voiding. No BM, last BM 1/2, received supp with no result yet. Tele V-paced.

## 2018-01-05 NOTE — PLAN OF CARE
Problem: Patient Care Overview  Goal: Plan of Care/Patient Progress Review  PT 5A: Evaluation completed, treatment initiated.     Discharge Planner PT   Patient plan for discharge: home  Current status: min assist for supine>sit and sit > stand to reinforce abdominal precautions.  Ambulated 125' without AD and min assist. Performed stairs with 1 rail and SBA.   Barriers to return to prior living situation: none  Recommendations for discharge: home.  May benefit from home PT if not ambulating independently without AD at time of discharge.   Rationale for recommendations: anticipate mod independent with functional mobility during expected LOS.        Entered by: Dav Bazzi 01/05/2018 3:13 PM

## 2018-01-05 NOTE — PROGRESS NOTES
01/05/18 1400   Quick Adds   Type of Visit Initial PT Evaluation       Present no   Living Environment   Lives With child(gu[place]ako), adult   Living Arrangements house   Home Accessibility bed and bath on same level   Number of Stairs to Enter Home 4  (2 rails)   Transportation Available car;family or friend will provide   Living Environment Comment patient may discharge to son's home locally prior to home.  son has 2 steps to enter without rail and full flight to bedroom but can sleep on couch on main level   Self-Care   Usual Activity Tolerance good   Current Activity Tolerance fair   Equipment Currently Used at Home raised toilet;shower chair;walker, rolling;cane, straight;grab bar  (has cane, FWW but does not use them)   Functional Level Prior   Ambulation 0-->independent   Transferring 0-->independent   Toileting 0-->independent   Bathing 0-->independent   Dressing 0-->independent   Eating 0-->independent   Communication 0-->understands/communicates without difficulty   Swallowing 0-->swallows foods/liquids without difficulty   Cognition 0 - no cognition issues reported   Fall history within last six months no   Prior Functional Level Comment independent with all functional mobility and ADLs, works part time   General Information   Onset of Illness/Injury or Date of Surgery - Date 01/03/18   Referring Physician Roland Perez DO   Patient/Family Goals Statement return home   Pertinent History of Current Problem (include personal factors and/or comorbidities that impact the POC) 84 year old POD#2 s/p XL, BREA, BSO, omentectomy for stage IV serous ovarian carcinoma, s/p 6 cycles neoadjuvent carbo/taxel   Precautions/Limitations abdominal precautions   Cognitive Status Examination   Orientation orientation to person, place and time   Level of Consciousness alert   Follows Commands and Answers Questions 100% of the time   Personal Safety and Judgment intact   Pain Assessment   Patient Currently in  "Pain Yes, see Vital Sign flowsheet   Posture    Posture Forward head position;Protracted shoulders;Kyphosis   Range of Motion (ROM)   ROM Comment B LE grossly WNL   Strength   Strength Comments B LE grossly 5/5   Bed Mobility   Bed Mobility Comments supine > sit min assist   Transfer Skills   Transfer Comments sit > stand min assist   Gait   Gait Comments ambulate in room without AD, min assist   Balance   Balance Comments stand without UE support   Sensory Examination   Sensory Perception no deficits were identified   General Therapy Interventions   Planned Therapy Interventions balance training;bed mobility training;gait training;transfer training;progressive activity/exercise   Clinical Impression   Criteria for Skilled Therapeutic Intervention yes, treatment indicated   PT Diagnosis impaired functional mobility s/p abdominal surgery   Influenced by the following impairments pain, impaired balance, decreased functional endurance   Functional limitations due to impairments impaired bed mobility, impaired transfers,impaired gait   Clinical Presentation Evolving/Changing   Clinical Presentation Rationale comorbidities   Clinical Decision Making (Complexity) Moderate complexity   Therapy Frequency` daily   Predicted Duration of Therapy Intervention (days/wks) 4 days   Anticipated Discharge Disposition Home with Assist   Risk & Benefits of therapy have been explained Yes   Patient, Family & other staff in agreement with plan of care Yes   [facility] AM-PAC  \"6 Clicks\" V.2 Basic Mobility Inpatient Short Form   1. Turning from your back to your side while in a flat bed without using bedrails? 4 - None   2. Moving from lying on your back to sitting on the side of a flat bed without using bedrails? 3 - A Little   3. Moving to and from a bed to a chair (including a wheelchair)? 3 - A Little   4. Standing up from a chair using your arms (e.g., wheelchair, or bedside chair)? 3 - A Little   5. To walk in hospital room? " 3 - A Little   6. Climbing 3-5 steps with a railing? 3 - A Little   Basic Mobility Raw Score (Score out of 24.Lower scores equate to lower levels of function) 19   Total Evaluation Time   Total Evaluation Time (Minutes) 9

## 2018-01-05 NOTE — PLAN OF CARE
Problem: Patient Care Overview  Goal: Plan of Care/Patient Progress Review  Outcome: Improving  Pt A&O, VSS on RA, and up with SBA to bedside commode.  Starting POD 2.  Abdominal pain partially managed with PRN oxycodone given x 2; scheduled tylenol given, and pillows provided for increased support.  MD rounded early this AM; placed order for simethicone for abdominal cramping.  Abdominal binder in place.  Incision dressing underneath.  R port remains saline locked.  Voiding without difficulties, LBM 1/2; senna given last night; no BM at this time.  Tele in place; NSR with intermittent tachycardia.  Pt LA 0.6.  Pt is able to call and make needs known.  Will continue to monitor and follow POC.

## 2018-01-05 NOTE — PROGRESS NOTES
Gynecology Oncology Progress Note  18    84 year old POD#2 s/p XL, BREA, BSO, omentectomy for stage IV serous ovarian carcinoma, s/p 6 cycles neoadjuvent carbo/taxel    24 hour events:   - No cardiac issues overnight.   - Triggered sepsis protocol overnight d/t elevated WBC 12.2 (down from 15 the day before) and mild tachy 101. LA returned normal 0.6. Visited patient at bedside who was comfortable and sleeping. Sat 92% on RA. Pain adequately controlled. No chills, fevers, worsening abdominal pain or pulmonary symptoms.    Subjective: Patient is feeling overall well, just some rumbling in her belly, and no gas yet. Walked four times yesterday, tolerating regular diet, no other concerns.       Objective:   Temp: 98.2  F (36.8  C) Temp  Min: 97.7  F (36.5  C)  Max: 98.8  F (37.1  C)  Resp: 18 Resp  Min: 14  Max: 20  SpO2: 92 % SpO2  Min: 92 %  Max: 98 %    No Data Recorded  Heart Rate: 102 Heart Rate  Min: 79  Max: 102  BP: 114/41 Systolic (24hrs), Av , Min:98 , Max:138   Diastolic (24hrs), Av, Min:41, Max:94    General: alert and oriented, in NAD  CV: regular rate and rhythm  Resp: clear bilaterally  Abdomen: soft, non tender, non distended  Incision: Sterile bandage in place with minimal shadowing   Extremities: nontender, trace edema, SCDs in place    I/O last 3 completed shifts:  In: 375 [P.O.:100; I.V.:275]  Out: 3300 [Urine:3300]     ROUTINE IP LABS (Last four results)  BMP    Recent Labs  Lab 18  0354 18  1450 18  0638    137  --    POTASSIUM 4.3 3.9 3.5   CHLORIDE 106 105  --    VICKIE 8.5 8.7  --    CO2 29 24  --    BUN 8 12  --    CR 0.45* 0.48*  --    * 149*  --      CBC    Recent Labs  Lab 18  0512 18  0354 18  1450 18  0638   WBC 9.0 12.2* 15.1*  --    RBC 3.11* 3.41* 3.77*  --    HGB 10.3* 11.3* 12.7 12.2   HCT 32.9* 35.2 38.8  --    * 103* 103*  --    MCH 33.1* 33.1* 33.7*  --    MCHC 31.3* 32.1 32.7  --    RDW 13.9 14.0 13.9  --      165 170  --      INR    Recent Labs  Lab 01/03/18  1450   INR 0.96     Assessment:  84 year old F POD#2 s/p XL, BREA, BSO, omentectomy for stage IV serous ovarian carcinoma, s/p 6 cycles neoadjuvent carbo/taxel     Plan:  FEN: regular diet  Pain: tylenol, Cristy, and Dilaudid; s/p TAP blocks  Heme: Hgb 12.2>> 11.3  CV: Dependent Pacemaker (placed 2016 for 3rd deg block), mild Ao stenosis, mod mitral regurg. No cardiac issues overnight. On ASA 81, Lasix, pravastatin held. On continuous telemetry.  Pulm: NI  GI: Stool softeners, PRN antiemetics.   : s/p hahn  ID: s/p perioperative Abx, NI  Psych/Neuro: NI  PPX: SCDs, ppx lovenox  Dispo: Home pending meeting postop goals    Arley Wooten  1/5/2018     Dennise Salazar MD    Department of Ob/Gyn and Women's Health  Division of Gynecologic Oncology  [facility]  [phone]    PT evaluation, voiding on her own, no flatus, pain controlled. IVF stopped, lovenox prophylaxis.

## 2018-01-05 NOTE — PROGRESS NOTES
01/05/18 0837   Quick Adds   Type of Visit Initial Occupational Therapy Evaluation   Living Environment   Lives With child(guanako), adult   Living Arrangements house   Home Accessibility bed and bath on same level;grab bars present (bathtub);grab bars present (toilet);stairs to enter home;stairs (2 railings present)   Number of Stairs to Enter Home 4   Stair Railings at Home outside, present at both sides   Transportation Available family or friend will provide;car   Living Environment Comment Pt reports son recently moving in with her after being laid off from work. Son able to provide assist with IADLs as needed. Other children also nearby to assist. Pt reports plans to stay 1-2 nights at her other son's home which requires accessing full flight of stairs to reach bed room. Is able to sleep on couch on main floor with just 2 stairs to access home if need be.    Self-Care   Dominant Hand right   Usual Activity Tolerance good   Current Activity Tolerance moderate   Equipment Currently Used at Home grab bar;raised toilet;shower chair  (Has w/c, cane, and walker but not using)   Activity/Exercise/Self-Care Comment Pt reports independence in IADLs including driving, med management, and work/volunteering.    Functional Level Prior   Ambulation 0-->independent   Transferring 0-->independent   Toileting 0-->independent   Bathing 0-->independent   Dressing 0-->independent   Eating 0-->independent   Communication 0-->understands/communicates without difficulty   Swallowing 0-->swallows foods/liquids without difficulty   Cognition 0 - no cognition issues reported   Fall history within last six months no   Which of the above functional risks had a recent onset or change? ambulation;transferring;dressing;bathing   Prior Functional Level Comment Pt independent in ADLs   General Information   Onset of Illness/Injury or Date of Surgery - Date 01/03/17   Referring Physician Roland Perez DO   Patient/Family Goals Statement Retunr  "home   Additional Occupational Profile Info/Pertinent History of Current Problem \"84 year old POD#2 s/p XL, BREA, BSO, omentectomy for stage IV serous ovarian carcinoma, s/p 6 cycles neoadjuvent carbo/taxel\"   Precautions/Limitations fall precautions;abdominal precautions   Weight-Bearing Status - LUE partial weight-bearing (% in comments)  (<10#)   Weight-Bearing Status - RUE partial weight-bearing (% in comments)  (<10#)   Weight-Bearing Status - LLE full weight-bearing   Weight-Bearing Status - RLE full weight-bearing   General Info Comments Activity: up with assist   Cognitive Status Examination   Orientation orientation to person, place and time   Cognitive Comment No concerns noted   Visual Perception   Visual Perception No deficits were identified;Wears glasses   Visual Perception Comments Glasses for reading   Sensory Examination   Sensory Comments Numbness in bilateral feet from chemo; endorses improvement over time   Integumentary/Edema   Integumentary/Edema no deficits were identifed   Posture   Posture forward head position;protracted shoulders;kyphosis   Range of Motion (ROM)   ROM Quick Adds No deficits were identified   Strength   Strength Comments MMT: not formally tested 2/2 abdominal precautions. Appears WFL with activity   Hand Strength   Hand Strength Comments No concerns   Coordination   Upper Extremity Coordination No deficits were identified   Mobility   Bed Mobility Bed mobility skill: Supine to sit   Bed Mobility Skill: Supine to Sit   Level of [place]: Supine/Sit minimum assist (75% patients effort)   Physical Assist/Nonphysical Assist: Supine/Sit verbal cues   Assistive Device: Supine/Sit bedrail   Transfer Skill: Sit to Stand   Level of [place]: Sit/Stand contact guard   Assistive Device for Transfer: Sit/Stand standard walker   Upper Body Dressing   Level of [place]: Dress Upper Body minimum assist (75% patients effort)   Lower Body Dressing   Level of [place]: Dress " "Lower Body other (see comments)  (SBA underwear and L sock; max A R sock)   Grooming   Level of [place]: Grooming stand-by assist   Activities of Daily Living Analysis   Impairments Contributing to Impaired Activities of Daily Living balance impaired;pain;post surgical precautions;strength decreased   General Therapy Interventions   Planned Therapy Interventions ADL retraining;IADL retraining;strengthening;transfer training;home program guidelines;progressive activity/exercise;risk factor education   Clinical Impression   Criteria for Skilled Therapeutic Interventions Met yes, treatment indicated   OT Diagnosis Decreased independence in ADLs   Influenced by the following impairments pain, abdominal precautions, post-op deconditioning   Assessment of Occupational Performance 3-5 Performance Deficits   Identified Performance Deficits dressing, bathing, functional mobility, home management   Clinical Decision Making (Complexity) Low complexity   Therapy Frequency daily   Predicted Duration of Therapy Intervention (days/wks) 1 week   Anticipated Discharge Disposition Transitional Care Facility;Home with Assist   Risks and Benefits of Treatment have been explained. Yes   Patient, Family & other staff in agreement with plan of care Yes   [facility] TM \"6 Clicks\"   2016, Trustees of [facility], under license to [employer].  All rights reserved.   6 Clicks Short Forms Daily Activity Inpatient Short Form   [facility]  \"6 Clicks\" Daily Activity Inpatient Short Form   1. Putting on and taking off regular lower body clothing? 3 - A Little   2. Bathing (including washing, rinsing, drying)? 3 - A Little   3. Toileting, which includes using toilet, bedpan or urinal? 3 - A Little   4. Putting on and taking off regular upper body clothing? 4 - None   5. Taking care of personal grooming such as brushing teeth? 4 - None   6. Eating meals? 4 - None   Daily Activity Raw Score (Score out of " 24.Lower scores equate to lower levels of function) 21   Total Evaluation Time   Total Evaluation Time (Minutes) 10

## 2018-01-05 NOTE — PROGRESS NOTES
Care Coordinator- Discharge Planning     Admission Date/Time:  1/3/2018  Attending MD:  Dennise Martines*     Data  Date of initial CC assessment: 1/5/2018   Chart reviewed, discussed with interdisciplinary team.   Patient was admitted for:   1. Preoperative examination    2. Other specified conditions associated with female genital organs and menstrual cycle          Assessment  Concerns with insurance coverage for discharge needs: None.  Current Living Situation: Patient lives with family.  Support System: Supportive and Involved  Services Involved: family assists  Transportation: Family or Friend will provide  Barriers to Discharge: pending medical clearance, declines HHC    RNCC met with pt 1/5/2018.  Explained RNCC role, discussed possible HHC for dc, pt declines states that she has her son to help her at home and does not feel she needs any therapy at home at this time and her son is in agreement with that.  Son will drive her home and help with follow up appointments.  Pt aware of follow up in 2 weeks at clinic and need for PCP follow up next week post surgical.       Plan  Anticipated Discharge Date:  1/6/18   Anticipated Discharge Plan: dc home with family.     Dariana Elias, ELLIOTTCC, BSN    [facility]  [address] [place] [id number]    [email or web address]  [email or web address]    Office: [phone] Pager: [phone]

## 2018-01-05 NOTE — PLAN OF CARE
Problem: Patient Care Overview  Goal: Plan of Care/Patient Progress Review  Pt transferred from  around 1600 for tele needs. She is POD1 for total hysterectomy. AOx4, VSS on RA. Up SBA. Reg diet, good appetite. Mejia removed yesterday, voiding spontaneously. Has not had BM since 1/2. Tele showing NSR with intermittent tachy (low 100s).  C/o abd pain, prn oxy 5 mg provided. Abd binder in place, as well. Right port a cath is SL. Calls/makes needs known. Will continue to monitor and follow POC.

## 2018-01-05 NOTE — PLAN OF CARE
Problem: Patient Care Overview  Goal: Plan of Care/Patient Progress Review  OT 5A: OT eval and treatment initiated  Discharge Planner OT   Patient plan for discharge: Home  Current status: Pt educated on abdominal precautions and appropriate grading/modifications for daily activity; written handout provided. Supine > sitting EOB min A with VCs for log roll technique. STS from EOB CGA and completing functional mobility into/from bathroom + additional ~70 feet CGA with FWW. Cues for upright posture. SBA for donning underwear and L sock given cues for adherence to abdominal precautions; requiring max A for R sock 2/2 decreased knee flexion/mobility. Tolerating standing ~10 minutes for g/h tasks at sink.   Barriers to return to prior living situation: pain, abdominal precautions, post-op deconditioning  Recommendations for discharge: TCU vs home with assist pending LOS and progress with therapies.   Rationale for recommendations: At this time pt would benefit from TCU stay to promote safety and independence with mobility and ADLs. However, pt adamant about return to home and with highly independent lifestyle PTA. Pt may progress quickly with therapies and be appropriate for discharge home as lives with son and would have support as needed.        Entered by: Chichi Pepper 01/05/2018 9:26 AM

## 2018-01-06 VITALS
DIASTOLIC BLOOD PRESSURE: 51 MMHG | WEIGHT: 173.5 LBS | BODY MASS INDEX: 28.91 KG/M2 | OXYGEN SATURATION: 92 % | TEMPERATURE: 97 F | HEART RATE: 84 BPM | RESPIRATION RATE: 18 BRPM | HEIGHT: 65 IN | SYSTOLIC BLOOD PRESSURE: 109 MMHG

## 2018-01-06 LAB
MAGNESIUM SERPL-MCNC: 1.7 MG/DL (ref 1.6–2.3)
PHOSPHATE SERPL-MCNC: 3 MG/DL (ref 2.5–4.5)
POTASSIUM SERPL-SCNC: 3.5 MMOL/L (ref 3.4–5.3)

## 2018-01-06 PROCEDURE — A9270 NON-COVERED ITEM OR SERVICE: HCPCS | Mod: GY | Performed by: STUDENT IN AN ORGANIZED HEALTH CARE EDUCATION/TRAINING PROGRAM

## 2018-01-06 PROCEDURE — 25000128 H RX IP 250 OP 636: Performed by: OBSTETRICS & GYNECOLOGY

## 2018-01-06 PROCEDURE — 83735 ASSAY OF MAGNESIUM: CPT | Performed by: SURGERY

## 2018-01-06 PROCEDURE — 84132 ASSAY OF SERUM POTASSIUM: CPT | Performed by: SURGERY

## 2018-01-06 PROCEDURE — 25000132 ZZH RX MED GY IP 250 OP 250 PS 637: Mod: GY | Performed by: STUDENT IN AN ORGANIZED HEALTH CARE EDUCATION/TRAINING PROGRAM

## 2018-01-06 PROCEDURE — 25000132 ZZH RX MED GY IP 250 OP 250 PS 637: Mod: GY | Performed by: OBSTETRICS & GYNECOLOGY

## 2018-01-06 PROCEDURE — 36592 COLLECT BLOOD FROM PICC: CPT | Performed by: SURGERY

## 2018-01-06 PROCEDURE — 25000128 H RX IP 250 OP 636: Performed by: SURGERY

## 2018-01-06 PROCEDURE — 84100 ASSAY OF PHOSPHORUS: CPT | Performed by: SURGERY

## 2018-01-06 PROCEDURE — 99024 POSTOP FOLLOW-UP VISIT: CPT | Mod: GC | Performed by: OBSTETRICS & GYNECOLOGY

## 2018-01-06 PROCEDURE — A9270 NON-COVERED ITEM OR SERVICE: HCPCS | Mod: GY | Performed by: OBSTETRICS & GYNECOLOGY

## 2018-01-06 RX ORDER — HEPARIN SODIUM (PORCINE) LOCK FLUSH IV SOLN 100 UNIT/ML 100 UNIT/ML
5 SOLUTION INTRAVENOUS
Status: DISCONTINUED | OUTPATIENT
Start: 2018-01-06 | End: 2018-01-06 | Stop reason: HOSPADM

## 2018-01-06 RX ADMIN — SIMETHICONE CHEW TAB 80 MG 80 MG: 80 TABLET ORAL at 08:47

## 2018-01-06 RX ADMIN — SIMETHICONE CHEW TAB 80 MG 80 MG: 80 TABLET ORAL at 12:17

## 2018-01-06 RX ADMIN — ACETAMINOPHEN 650 MG: 325 TABLET, FILM COATED ORAL at 08:46

## 2018-01-06 RX ADMIN — SENNOSIDES AND DOCUSATE SODIUM 2 TABLET: 8.6; 5 TABLET ORAL at 12:17

## 2018-01-06 RX ADMIN — ENOXAPARIN SODIUM 40 MG: 40 INJECTION SUBCUTANEOUS at 08:52

## 2018-01-06 RX ADMIN — ASPIRIN 81 MG: 81 TABLET, COATED ORAL at 08:47

## 2018-01-06 RX ADMIN — SODIUM CHLORIDE, PRESERVATIVE FREE 5 ML: 5 INJECTION INTRAVENOUS at 12:18

## 2018-01-06 RX ADMIN — FUROSEMIDE 20 MG: 20 TABLET ORAL at 08:47

## 2018-01-06 RX ADMIN — ACETAMINOPHEN 650 MG: 325 TABLET, FILM COATED ORAL at 02:21

## 2018-01-06 ASSESSMENT — PAIN DESCRIPTION - DESCRIPTORS: DESCRIPTORS: SORE

## 2018-01-06 NOTE — PROGRESS NOTES
Pt has order to d/c home today. Pt is stable and agreeable to d/c. Port heparinized and de-accessed. Reviewed d/c instructions and meds with pt. Lovenox handout and teaching done, pt able to return demonstration with good technique. Pt asking questions appropriately. Pt verbalizes family will be able to assist PRN. Incision with steri-strips intact and abd binder on, cares instructed. Son here to transport pt home.

## 2018-01-06 NOTE — PLAN OF CARE
Problem: Patient Care Overview  Goal: Plan of Care/Patient Progress Review  Patient alert and oriented x4. Vitals stable on room air. Tmax 99.5. Patient c/o abdominal pain. Gave scheduled tylenol. Abdominal binder in place. Patient ambulating with SBA and walker to the bathroom. Patient had one small loose stool this shift. Tolerating regular diet well denies nausea, vomiting, SOB or chest pain. Tele V-paced. Right chest port saline locked. Patient slept between cares. Will continue to monitor and follow plan of care.

## 2018-01-06 NOTE — PROGRESS NOTES
Gynecology Oncology Progress Note  18    84 year old POD#3 s/p XL, BREA, BSO, omentectomy for stage IV serous ovarian carcinoma, s/p 6 cycles neoadjuvent carbo/taxel    24 hour events:   PT eval    Subjective:   Doing well. Pain is well controlled. Denies any nausea, tolerating PO.  Passing flatus.  Ambulating with a walker.    Objective:   Temp: 99.5  F (37.5  C) Temp  Min: 97.8  F (36.6  C)  Max: 99.5  F (37.5  C)  Resp: 16 Resp  Min: 16  Max: 18  SpO2: 91 % SpO2  Min: 91 %  Max: 94 %  Pulse: 86 Pulse  Min: 86  Max: 86  Heart Rate: 85 Heart Rate  Min: 85  Max: 93  BP: 112/44 Systolic (24hrs), Av , Min:112 , Max:127   Diastolic (24hrs), Av, Min:44, Max:55    General: alert and oriented, in NAD  CV: regular rate and rhythm  Resp: clear bilaterally  Abdomen: soft, non tender, non distended  Incision: Sterile bandage in place with minimal shadowing   Extremities: nontender, trace edema, SCDs in place    I/O last 3 completed shifts:  In: 620 [P.O.:600; I.V.:20]  Out: 200 [Urine:200]       Assessment:  84 year old F POD#3 s/p XL, BREA, BSO, omentectomy for stage IV serous ovarian carcinoma, s/p 6 cycles neoadjuvent carbo/taxel.     Plan:  FEN: regular diet  Pain: tylenol, Cristy, and Dilaudid; s/p TAP blocks  Heme: Hgb 12.2>> 11.3  CV: Dependent Pacemaker (placed  for 3rd deg block), mild Ao stenosis, mod mitral regurg. No cardiac issues overnight. On ASA 81, Lasix, pravastatin held. On continuous telemetry.  Pulm: NI  GI: Stool softeners, PRN antiemetics.   : s/p hahn  ID: s/p perioperative Abx, NI  Psych/Neuro: NI  PPX: SCDs, ppx lovenox  Dispo: Home pending meeting postop goals    Deepali Bender MD  OB/GYN PGY3 2018     Dennise Salazar MD    Department of Ob/Gyn and Women's Health  Division of Gynecologic Oncology  [facility]  [phone]    I saw and evaluated the patient with the fellow. I edited and reviewed the above note.  Plan home this  afternoon or tomorrow. Tolerating diet. Lovenox prophylaxis.

## 2018-01-06 NOTE — PLAN OF CARE
Problem: Patient Care Overview  Goal: Plan of Care/Patient Progress Review  Occupational Therapy Discharge Summary    Reason for therapy discharge:    Discharged to home.    Progress towards therapy goal(s). See goals on Care Plan in [facility] electronic health record for goal details.  Goals not met.  Barriers to achieving goals:   discharge from facility.    Therapy recommendation(s):    Continued therapy is recommended.  Rationale/Recommendations:  Pt would benefit from further therapy given abdominal precautions and baseline funcitonal mobility without AD..

## 2018-01-06 NOTE — PROGRESS NOTES
Care Coordinator- Discharge Planning     Admission Date/Time:  1/3/2018  Attending MD:  Dennise Martines*     Data  Date of initial CC assessment:  1/6/2018  Chart reviewed, discussed with interdisciplinary team.   Patient was admitted for:   1. Malignant neoplasm of ovary, unspecified laterality (H)    2. Preoperative examination    3. Other specified conditions associated with female genital organs and menstrual cycle     4. S/P hysterectomy         Assessment  Full assessment completed in previous note    Coordination of Care and Referrals: Provided patient/family with options for Home Care. Patient declining home care at this time. Patient's son able to assist. No further discharge concerns at this time. CC to follow as needed.      Plan  Anticipated Discharge Date:  1/6/2018  Anticipated Discharge Plan:  Home with family assist      CTS Handoff completed:  YES    Megan Garrison RN, BSN  Care Coordinator, 8A  Phone [phone]  Pager [phone]

## 2018-01-06 NOTE — PLAN OF CARE
Problem: Patient Care Overview  Goal: Plan of Care/Patient Progress Review  PT/5A:     Physical Therapy Discharge Summary    Reason for therapy discharge:    Discharged to home with assist     Progress towards therapy goal(s). See goals on Care Plan in [facility] electronic health record for goal details.  Goals partially met.  Barriers to achieving goals:   discharge from facility.    Therapy recommendation(s):    Continued therapy is recommended.  Rationale/Recommendations:  Pt would benefit from further therapy given abdominal precautions and baseline funcitonal mobility without AD...

## 2018-01-06 NOTE — PLAN OF CARE
Problem: Patient Care Overview  Goal: Plan of Care/Patient Progress Review  Outcome: No Change  Pt admitted for a hysterectomy on 1/3. Incision down the mid, lower abdomen with steri strips in place. Pt wore an abdominal binder. Pt complained of abdominal pain and received scheduled Tylenol and PRN Oxycodone. Pt also complained of abdominal cramping/ gas pain and received scheduled Simethicone. Pt ambulated to the bathroom with a stand by assist and a walker. Pt had one small watery BM. Right chest port saline locked. Tele v-paced. Pt able to make her needs known. Continue with plan of care.

## 2018-01-08 NOTE — PROGRESS NOTES
Follow Up Notes on Referred Patient    Date: 2017       Dr. Terrell Barry  [facility]  [address]  [place] [address]       RE: Kymberly Miller  : 1933  SEBASTIAN: 2017    Dear Dr. Terrell Barry:    Kymberly Miller is a 84 year old woman with a diagnosis of  Stage IV high-grade serous ovarian carcinoma.   She is here today for follow-up after completing 6 cycles of neoadjuvant chemotherapy based on ERIN-7 protocol with carboplatin weekly of AUC of 2 as well as paclitaxel dose of 60 mg/m2 weekly. Her  normalized to 12.    Ms. Miller feels well after completing her chemo. States that she has some neuropathy on the bottom of her feet that has been getting a little worse with each cycle. Denies neuropathy in her hands. Denies nausea / vomiting. Has been able to tolerate regular diet. Denies vaginal bleeding or urinary symptoms. Struggles with constipation, but normal bowel function over last couple of weeks. Here to discuss surgery.    Review of Systems: negative with exception of above    Past Medical History:  Past Medical History:   Diagnosis Date     Ascites      Chronic diastolic heart failure (H)      Hyperlipidemia      Mild aortic stenosis      Moderate mitral regurgitation      Nonobstructive atherosclerosis of coronary artery      Obesity      Ovarian cancer (H)      Pacemaker     [employer] Gener[employer] Model # [id number] Serial # [id number] Implant date 2016 Indication 3RD DEGREE BLOCK     Third degree heart block (H)      Past Surgical History:  Past Surgical History:   Procedure Laterality Date     CATARACT IOL, RT/LT       CHOLECYSTECTOMY       HC LEFT HEART CATHETERIZATION       IMPLANT PACEMAKER  2016     OTHER SURGICAL HISTORY  2017    power port placement       Health Maintenance Due   Topic Date Due     TETANUS IMMUNIZATION (SYSTEM ASSIGNED)  1951     ADVANCE DIRECTIVE PLANNING Q5 YRS  1988     FALL RISK ASSESSMENT  1998      PNEUMOCOCCAL (1 of 2 - PCV13) 09/30/1998     INFLUENZA VACCINE (SYSTEM ASSIGNED)  09/01/2017     Current Medications:   Current Outpatient Prescriptions   Medication Sig Dispense Refill     lidocaine-prilocaine (EMLA) cream Apply topically as needed for moderate pain       aspirin 81 MG EC tablet Take 81 mg by mouth Takes three times a week       Cholecalciferol (VITAMIN D) 2000 UNITS tablet Take 2,000 Units by mouth daily        furosemide (LASIX) 40 MG tablet Take 40 mg by mouth daily        multivitamin, therapeutic with minerals (MULTI-VITAMIN) TABS tablet Take by mouth daily        Omega-3 Fatty Acids (FISH OIL) 1200 MG CAPS Take 1,200 mg by mouth daily        pravastatin (PRAVACHOL) 40 MG tablet Take 40 mg by mouth daily        iohexol (OMNIPAQUE) 140 MG/ML SOLN solution Mix entire bottle (50ml) of contast with 600ml (20 ounces) of water and drink half 2 hrs prior to CT scan and half 1 hr prior to scan (Patient not taking: Reported on 12/11/2017) 50 mL 0     POTASSIUM CHLORIDE PO Take 10 mEq by mouth daily       iohexol (OMNIPAQUE) 140 MG/ML SOLN solution Mix entire bottle (50ml) of contast with 600ml (20 ounces) of water and drink half 2 hrs prior to CT scan and half 1 hr prior to scan (Patient not taking: Reported on 9/18/2017) 50 mL 0     acetaminophen (TYLENOL) 325 MG tablet Take 650 mg by mouth as needed        PROCHLORPERAZINE MALEATE PO Take 10 mg by mouth       Allergies:   Allergies   Allergen Reactions     Atorvastatin      Muscle pain       Social History:  Social History   Substance Use Topics     Smoking status: Never Smoker     Smokeless tobacco: Never Used     Alcohol use 1.2 - 1.8 oz/week     2 - 3 Cans of beer per week       History   Drug Use Not on file     Family History:     Family History   Problem Relation Age of Onset     CEREBROVASCULAR DISEASE Mother      HEART DISEASE Father      Chronic Obstructive Pulmonary Disease Sister      Esophageal Cancer Brother 41     CEREBROVASCULAR  "DISEASE Brother 80     Heart Failure Son      Pacemaker Son      Physical Exam:     /72 (BP Location: Right arm, Patient Position: Sitting, Cuff Size: Adult Regular)  Pulse 88  Temp 98.2  F (36.8  C) (Oral)  Resp 16  Ht 1.672 m (5' 5.83\")  Wt 79.9 kg (176 lb 3.2 oz)  SpO2 98%  Breastfeeding? No  BMI 28.59 kg/m2  Body mass index is 28.59 kg/(m^2).    General Appearance: healthy and alert, no distress     HEENT:  no thyromegaly, no palpable nodules or masses        Cardiovascular: regular rate and rhythm, no gallops, rubs or murmurs     Respiratory: lungs clear, no rales, rhonchi or wheezes, normal diaphragmatic excursion    Musculoskeletal: extremities non tender and with trace bilateral edema    Skin: no lesions or rashes     Neurological: normal gait, no gross defects     Psychiatric: appropriate mood and affect                               Hematological: normal cervical, supraclavicular and inguinal lymph nodes     Gastrointestinal:       abdomen soft, non-tender, non-distended, no organomegaly or masses    Assessment:    Kymberly Miller is a 84 year old woman with a diagnosis of stage IV high-grade serous ovarian carcinoma s/p 6 cycles of neoadjuvant chemotherapy.     A total of 30 minutes was spent with the patient, 20 minutes of which were spent in counseling the patient and/or treatment planning.    Plan:     1.)    Stage IV high-grade serous ovarian carcinoma s/p 6 cycles of NACT: Positive treatment response. The 3 mm lung nodules are unchanged which makes me lean towards unrelated to carcinoma. Discussed with patient interval debulking. Risks, benefits, alternatives to therapy discussed. Patient desires interval debulking surgery. Questions solicited and answered.     Patient elects for surgical management. Consented patient for Exploratory laparotomy, BREA/BSO, omentectomy, possible pelvic and para-aortic lymph node dissection, possible bowel surgery, tumor debulking. Risks, benefits and " alternatives to proceed discussed in detail with the patient. Risks include but are not limited to bleeding, infection, possible injury to surrounding organs including bowel, bladder, ureter, need for second procedure/surgery related to complications from first procedure, postoperative medical complications such as cardiopulmonary events, lymphedema, lymphocyst, thromboembolic events. Consent for surgery, blood transfusion signed. Will arrange appropriate preoperative blood work, CXR, EKG. Patient also advised on need for postoperative surveillance and/or adjuvant therapy. Questions answered.    José Cole MD, MS    Department of Obstetrics and Gynecology   Division of Gynecologic Oncology   [facility]  Phone: [phone]      CC  Patient Care Team:  Terrell Nichole as PCP - General (Family Practice)  Lily Trejo as Referring Physician (Hematology & Oncology)  TERRELL NICHOLE   no heavy lifting/no sports/gym/no exercise

## 2018-01-09 ENCOUNTER — CARE COORDINATION (OUTPATIENT)
Dept: ONCOLOGY | Facility: CLINIC | Age: 83
End: 2018-01-09

## 2018-01-09 LAB — COPATH REPORT: NORMAL

## 2018-01-09 NOTE — PROGRESS NOTES
Post-Discharge Phone Call:    Pain:  1) Location: Abdominal   2) Rate pain: Tolerable   3) Is your pain well controlled on your pain medication?: Yes  4) How often are you taking your pain medication?: Only as needed. Patient only taking Tylenol.     GI:  5) Last bowel movement: Today  6) Are you having regular bowel movements? Yes  7) Eating/drinking well?: Yes  8) Nausea?: No    Urinary:  9) Are you having problems or difficulty with urination? No      Lower Extremities:  10) Were lymph nodes removed during surgery?  N/A  11) If yes, have you been offered a lymphedema consult appointment?  N/A  12) Any pain, redness, or swelling in legs?  No  13) Any area on your legs that are warm to touch? No  14) Chest pain or severe shortness of breath? No    Wound:  15) Type of incision: Abdominal  Any of the following:  - Drainage (color, amount): No  - Odor: No  - Redness: No  - Chills: No  - Fever: No  16) Staples - Have you had your staples out yet? (staples should be removed 7-10 days post-op): N/A  17) Pt was reminded to wash incision (allow warm, soapy water to run over incision): Yes    Post-op:  18) Verify date and time of appointment: yes  19) Pt was informed that pathology will be discussed at this appointment Yes    Any other questions or concerns at this time? No     Ana Laura Molina RN

## 2018-01-29 ENCOUNTER — OFFICE VISIT (OUTPATIENT)
Dept: ONCOLOGY | Facility: CLINIC | Age: 83
End: 2018-01-29
Attending: OBSTETRICS & GYNECOLOGY
Payer: COMMERCIAL

## 2018-01-29 VITALS
BODY MASS INDEX: 28.59 KG/M2 | RESPIRATION RATE: 16 BRPM | DIASTOLIC BLOOD PRESSURE: 77 MMHG | WEIGHT: 171.8 LBS | HEART RATE: 86 BPM | TEMPERATURE: 98.4 F | SYSTOLIC BLOOD PRESSURE: 139 MMHG | OXYGEN SATURATION: 96 %

## 2018-01-29 DIAGNOSIS — C56.9 OVARIAN CANCER, UNSPECIFIED LATERALITY (H): Primary | ICD-10-CM

## 2018-01-29 PROCEDURE — 99024 POSTOP FOLLOW-UP VISIT: CPT | Mod: ZP | Performed by: OBSTETRICS & GYNECOLOGY

## 2018-01-29 PROCEDURE — 40000114 ZZH STATISTIC NO CHARGE CLINIC VISIT

## 2018-01-29 ASSESSMENT — PAIN SCALES - GENERAL: PAINLEVEL: NO PAIN (0)

## 2018-01-29 NOTE — PROGRESS NOTES
Follow Up Notes on Referred Patient    Date: 2018       Dr. Terrell Barry  [facility]  [address]  [place] [address]       RE: Kymberly Miller  : 1933  SEBASTIAN: 2018    Dear Dr. 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   Patient presents today for follow up.  She has been doing well since surgery.  No nausea, vomiting, fevers or chills.   Normal urinary and bowel function.  No B symptoms.  No vaginal bleeding.  Has a small drainage incision from her incision.  Has been cultured normal of skin efren.  No antibiotics.         Past Medical History:    Past Medical History:   Diagnosis Date     Ascites     From ovarian cancer     Heart murmur      Hyperlipidemia      Mild aortic stenosis      Moderate mitral regurgitation      Nonobstructive atherosclerosis of coronary artery      Obesity      Ovarian cancer (H)      Pacemaker 2016    [employer] Gener[employer] Model # [id number] Serial # [id number] Implant date 2016 Indication 3RD DEGREE BLOCK         Past Surgical History:    Past Surgical History:   Procedure Laterality Date     CATARACT IOL, RT/LT       CHOLECYSTECTOMY       HC LEFT HEART CATHETERIZATION       HYSTERECTOMY TOTAL ABD, MORRO SALPINGO-OOPHORECTOMY, NODE DISSECTION, TUMOR DEBULKING, COMBINED N/A 1/3/2018    Procedure: COMBINED HYSTERECTOMY TOTAL ABDOMINAL, SALPINGO-OOPHORECTOMY, NODE DISSECTION, TUMOR DEBULKING;  Exploratory Laparotomy, Total Abdominal Hysterectomy, Bilateral Salpingo-Oophorectomy, Tumor Debulking, Omentectomy;  Surgeon: José Cole MD;  Location: [facility] OR     IMPLANT PACEMAKER  2016     OTHER SURGICAL HISTORY  2017    right chest port placement, power port     VASCULAR SURGERY      portacath placed         Health Maintenance Due   Topic Date Due     TETANUS IMMUNIZATION (SYSTEM ASSIGNED)  1951     ADVANCE DIRECTIVE PLANNING Q5 YRS   09/30/1988     FALL RISK ASSESSMENT  09/30/1998     PNEUMOCOCCAL (1 of 2 - PCV13) 09/30/1998     INFLUENZA VACCINE (SYSTEM ASSIGNED)  09/01/2017       Current Medications:     Current Outpatient Prescriptions   Medication Sig Dispense Refill     enoxaparin (LOVENOX) 40 MG/0.4ML injection Inject 0.4 mLs (40 mg) Subcutaneous every 24 hours for 25 days 10 mL 0     senna-docusate (SENOKOT-S;PERICOLACE) 8.6-50 MG per tablet Take 1-2 tablets by mouth 2 times daily 100 tablet 0     lidocaine-prilocaine (EMLA) cream Apply topically as needed for moderate pain       acetaminophen (TYLENOL) 325 MG tablet Take 650 mg by mouth as needed        aspirin 81 MG EC tablet Take 81 mg by mouth every morning Takes three times a week       Cholecalciferol (VITAMIN D) 2000 UNITS tablet Take 2,000 Units by mouth every morning        furosemide (LASIX) 40 MG tablet Take 20 mg by mouth every morning        multivitamin, therapeutic with minerals (MULTI-VITAMIN) TABS tablet Take 1 tablet by mouth every morning        Omega-3 Fatty Acids (FISH OIL) 1200 MG CAPS Take 1,200 mg by mouth every morning        pravastatin (PRAVACHOL) 40 MG tablet Take 40 mg by mouth At Bedtime        oxyCODONE IR (ROXICODONE) 5 MG tablet Take 1-2 tablets (5-10 mg) by mouth every 3 hours as needed for moderate to severe pain (Patient not taking: Reported on 1/29/2018) 30 tablet 0         Allergies:        Allergies   Allergen Reactions     Atorvastatin      Muscle pain         Social History:     Social History   Substance Use Topics     Smoking status: Never Smoker     Smokeless tobacco: Never Used     Alcohol use 1.2 - 1.8 oz/week     2 - 3 Cans of beer per week      Comment: once a year       History   Drug Use No         Family History:       Family History   Problem Relation Age of Onset     CEREBROVASCULAR DISEASE Mother      HEART DISEASE Father      Chronic Obstructive Pulmonary Disease Sister      Esophageal Cancer Brother 41     CEREBROVASCULAR DISEASE  Brother 80     Heart Failure Son      Pacemaker Son          Physical Exam:     /77  Pulse 86  Temp 98.4  F (36.9  C) (Oral)  Resp 16  Wt 77.9 kg (171 lb 12.8 oz)  SpO2 96%  BMI 28.59 kg/m2  Body mass index is 28.59 kg/(m^2).    General Appearance: healthy and alert, no distress     ABDOMEN:  Soft, nontender and nondistended.  No organomegaly.  Well-healing midline incision.  No signs of infection.  She has a very superficial, about 1 cm fibrinous area that appears to be infected around the umbilicus.  Otherwise, clean, dry and intact.   PELVIC:  Normal external genitalia.  Healing vaginal cuff, intact.  Confirmed by bimanual exam.           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     A total of 20 minutes was spent with the patient, 15 minutes of which were spent in counseling the patient and/or treatment planning.    1.  Stage III, high-grade serous ovarian cancer.     2.  Status post neoadjuvant chemotherapy, on ERIN-7 protocol.   3.  Status post R0 interval cytoreduction.        I discussed with the patient given that there is still foci of viable tumor in the omentum, I recommend another 3 cycles of chemotherapy based on ERIN-7 protocol, carboplatin and AUC of 2 and Taxol at a dose of 60 mg/m2 weekly.  Again, a total of 3 cycles, which would be 9 weeks of treatment.  The patient as well as her family agrees with the plan.  They are very appreciative of her care.  Maybe she will come back here for surveillance or see her local medical oncologist with followup every 3-4 months for the first 2 years and then every 6 months for the next 3 years and then yearly with a  and a physical exam.  Again, they agree with the plan.  They are very appreciative of her care.  All questions were answered.         José Cole MD, MS    Department of Obstetrics and Gynecology   Division of Gynecologic  Oncology   [facility]  Phone: [phone]            CC  Patient Care Team:  Katrina Mcclendon MD, MD as PCP - General (Internal Medicine)  Lily Trejo as Referring Physician (Hematology & Oncology)  NADEGE NICHOLE

## 2018-01-29 NOTE — LETTER
2018       RE: Kymberly Miller  [address] [address]     Dear Colleague,    Thank you for referring your patient, Kymberly Miller, to the [facility]. Please see a copy of my visit note below.                Follow Up Notes on Referred Patient    Date: 2018       Dr. Terrell Barry  [facility]  [address]  [place] [address]       RE: Kymberly Miller  : 1933  SEBASTIAN: 2018    Dear Dr. 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   Patient presents today for follow up.  She has been doing well since surgery.  No nausea, vomiting, fevers or chills.   Normal urinary and bowel function.  No B symptoms.  No vaginal bleeding.  Has a small drainage incision from her incision.  Has been cultured normal of skin efren.  No antibiotics.         Past Medical History:    Past Medical History:   Diagnosis Date     Ascites     From ovarian cancer     Heart murmur      Hyperlipidemia      Mild aortic stenosis      Moderate mitral regurgitation      Nonobstructive atherosclerosis of coronary artery      Obesity      Ovarian cancer (H)      Pacemaker 2016    [employer] Gener[employer] Model # [id number] Serial # [id number] Implant date 2016 Indication 3RD DEGREE BLOCK         Past Surgical History:    Past Surgical History:   Procedure Laterality Date     CATARACT IOL, RT/LT       CHOLECYSTECTOMY       HC LEFT HEART CATHETERIZATION       HYSTERECTOMY TOTAL ABD, MORRO SALPINGO-OOPHORECTOMY, NODE DISSECTION, TUMOR DEBULKING, COMBINED N/A 1/3/2018    Procedure: COMBINED HYSTERECTOMY TOTAL ABDOMINAL, SALPINGO-OOPHORECTOMY, NODE DISSECTION, TUMOR DEBULKING;  Exploratory Laparotomy, Total Abdominal Hysterectomy, Bilateral Salpingo-Oophorectomy, Tumor Debulking, Omentectomy;  Surgeon: José Cole MD;  Location: [facility] OR     IMPLANT PACEMAKER  2016     OTHER SURGICAL HISTORY  2017     right chest port placement, power port     VASCULAR SURGERY      portacath placed         Health Maintenance Due   Topic Date Due     TETANUS IMMUNIZATION (SYSTEM ASSIGNED)  09/30/1951     ADVANCE DIRECTIVE PLANNING Q5 YRS  09/30/1988     FALL RISK ASSESSMENT  09/30/1998     PNEUMOCOCCAL (1 of 2 - PCV13) 09/30/1998     INFLUENZA VACCINE (SYSTEM ASSIGNED)  09/01/2017       Current Medications:     Current Outpatient Prescriptions   Medication Sig Dispense Refill     enoxaparin (LOVENOX) 40 MG/0.4ML injection Inject 0.4 mLs (40 mg) Subcutaneous every 24 hours for 25 days 10 mL 0     senna-docusate (SENOKOT-S;PERICOLACE) 8.6-50 MG per tablet Take 1-2 tablets by mouth 2 times daily 100 tablet 0     lidocaine-prilocaine (EMLA) cream Apply topically as needed for moderate pain       acetaminophen (TYLENOL) 325 MG tablet Take 650 mg by mouth as needed        aspirin 81 MG EC tablet Take 81 mg by mouth every morning Takes three times a week       Cholecalciferol (VITAMIN D) 2000 UNITS tablet Take 2,000 Units by mouth every morning        furosemide (LASIX) 40 MG tablet Take 20 mg by mouth every morning        multivitamin, therapeutic with minerals (MULTI-VITAMIN) TABS tablet Take 1 tablet by mouth every morning        Omega-3 Fatty Acids (FISH OIL) 1200 MG CAPS Take 1,200 mg by mouth every morning        pravastatin (PRAVACHOL) 40 MG tablet Take 40 mg by mouth At Bedtime        oxyCODONE IR (ROXICODONE) 5 MG tablet Take 1-2 tablets (5-10 mg) by mouth every 3 hours as needed for moderate to severe pain (Patient not taking: Reported on 1/29/2018) 30 tablet 0         Allergies:        Allergies   Allergen Reactions     Atorvastatin      Muscle pain         Social History:     Social History   Substance Use Topics     Smoking status: Never Smoker     Smokeless tobacco: Never Used     Alcohol use 1.2 - 1.8 oz/week     2 - 3 Cans of beer per week      Comment: once a year       History   Drug Use No         Family History:        Family History   Problem Relation Age of Onset     CEREBROVASCULAR DISEASE Mother      HEART DISEASE Father      Chronic Obstructive Pulmonary Disease Sister      Esophageal Cancer Brother 41     CEREBROVASCULAR DISEASE Brother 80     Heart Failure Son      Pacemaker Son          Physical Exam:     /77  Pulse 86  Temp 98.4  F (36.9  C) (Oral)  Resp 16  Wt 77.9 kg (171 lb 12.8 oz)  SpO2 96%  BMI 28.59 kg/m2  Body mass index is 28.59 kg/(m^2).    General Appearance: healthy and alert, no distress     ABDOMEN:  Soft, nontender and nondistended.  No organomegaly.  Well-healing midline incision.  No signs of infection.  She has a very superficial, about 1 cm fibrinous area that appears to be infected around the umbilicus.  Otherwise, clean, dry and intact.   PELVIC:  Normal external genitalia.  Healing vaginal cuff, intact.  Confirmed by bimanual exam.           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     A total of 20 minutes was spent with the patient, 15 minutes of which were spent in counseling the patient and/or treatment planning.    1.  Stage III, high-grade serous ovarian cancer.     2.  Status post neoadjuvant chemotherapy, on ERIN-7 protocol.   3.  Status post R0 interval cytoreduction.        I discussed with the patient given that there is still foci of viable tumor in the omentum, I recommend another 3 cycles of chemotherapy based on ERIN-7 protocol, carboplatin and AUC of 2 and Taxol at a dose of 60 mg/m2 weekly.  Again, a total of 3 cycles, which would be 9 weeks of treatment.  The patient as well as her family agrees with the plan.  They are very appreciative of her care.  Maybe she will come back here for surveillance or see her local medical oncologist with followup every 3-4 months for the first 2 years and then every 6 months for the next 3 years and then yearly with a  and a physical exam.  Again, they  agree with the plan.  They are very appreciative of her care.  All questions were answered.         José Cole MD, MS    Department of Obstetrics and Gynecology   Division of Gynecologic Oncology   [facility]  Phone: [phone]            CC  Patient Care Team:  Katrina Mcclendon MD, MD as PCP - General (Internal Medicine)  Lily Trejo as Referring Physician (Hematology & Oncology)  NADEGE NICHOLE

## 2018-01-29 NOTE — MR AVS SNAPSHOT
"              After Visit Summary   2018    Kymberly Miller    MRN: [id number]           Patient Information     Date Of Birth          1933        Visit Information        Provider Department      2018 10:20 AM José Cole MD [facility]        Today's Diagnoses     Ovarian cancer, unspecified laterality (H)    -  1       Follow-ups after your visit        Who to contact     If you have questions or need follow up information about today's clinic visit or your schedule please contact [facility] directly at [phone].  Normal or non-critical lab and imaging results will be communicated to you by Tanglerhart, letter or phone within 4 business days after the clinic has received the results. If you do not hear from us within 7 days, please contact the clinic through Tanglerhart or phone. If you have a critical or abnormal lab result, we will notify you by phone as soon as possible.  Submit refill requests through Jemstep or call your pharmacy and they will forward the refill request to us. Please allow 3 business days for your refill to be completed.          Additional Information About Your Visit        MyChart Information     Jemstep lets you send messages to your doctor, view your test results, renew your prescriptions, schedule appointments and more. To sign up, go to [email or web address] . Click on \"Log in\" on the left side of the screen, which will take you to the Welcome page. Then click on \"Sign up Now\" on the right side of the page.     You will be asked to enter the access code listed below, as well as some personal information. Please follow the directions to create your username and password.     Your access code is: [email or web address]  Expires: 2018  9:44 AM     Your access code will  in 90 days. If you need help or a new code, please call your [place] clinic or [phone].        Care EveryWhere ID     This is your Care EveryWhere ID. This " could be used by other organizations to access your [place] medical records  [id number]        Your Vitals Were     Pulse Temperature Respirations Pulse Oximetry BMI (Body Mass Index)       86 98.4  F (36.9  C) (Oral) 16 96% 28.59 kg/m2        Blood Pressure from Last 3 Encounters:   01/29/18 139/77   01/06/18 109/51   12/11/17 135/63    Weight from Last 3 Encounters:   01/29/18 77.9 kg (171 lb 12.8 oz)   01/03/18 78.7 kg (173 lb 8 oz)   12/11/17 80.1 kg (176 lb 9.6 oz)              Today, you had the following     No orders found for display       Primary Care Provider Office Phone # Fax #    Katrina Mcclendon MD, [phone]       [place] [address]        Equal Access to Services     [facility]: Hadii aad ku hadasho Soroya, waaxda luqadaha, qaybta kaalmada adeegyada, peter kemp . So [facility] [phone].    ATENCIÓN: Si habla español, tiene a lozano disposición servicios gratuitos de asistencia lingüística. Ramos al [phone].    We comply with applicable federal civil rights laws and [place] laws. We do not discriminate on the basis of race, color, national origin, age, disability, sex, sexual orientation, or gender identity.            Thank you!     Thank you for choosing [facility]  for your care. Our goal is always to provide you with excellent care. Hearing back from our patients is one way we can continue to improve our services. Please take a few minutes to complete the written survey that you may receive in the mail after your visit with us. Thank you!             Your Updated Medication List - Protect others around you: Learn how to safely use, store and throw away your medicines at www.disposemymeds.org.          This list is accurate as of 1/29/18 11:59 PM.  Always use your most recent med list.                   Brand Name Dispense Instructions for use Diagnosis    acetaminophen 325 MG tablet     TYLENOL     Take 650 mg by mouth as needed        aspirin 81 MG EC tablet      Take 81 mg by mouth every morning Takes three times a week        enoxaparin 40 MG/0.4ML injection    LOVENOX    10 mL    Inject 0.4 mLs (40 mg) Subcutaneous every 24 hours for 25 days    S/P hysterectomy       fish Oil 1200 MG capsule      Take 1,200 mg by mouth every morning        furosemide 40 MG tablet    LASIX     Take 20 mg by mouth every morning        lidocaine-prilocaine cream    EMLA     Apply topically as needed for moderate pain        Multi-vitamin Tabs tablet      Take 1 tablet by mouth every morning        oxyCODONE IR 5 MG tablet    ROXICODONE    30 tablet    Take 1-2 tablets (5-10 mg) by mouth every 3 hours as needed for moderate to severe pain    S/P hysterectomy       pravastatin 40 MG tablet    PRAVACHOL     Take 40 mg by mouth At Bedtime        senna-docusate 8.6-50 MG per tablet    SENOKOT-S;PERICOLACE    100 tablet    Take 1-2 tablets by mouth 2 times daily    S/P hysterectomy       vitamin D 2000 UNITS tablet      Take 2,000 Units by mouth every morning

## 2022-10-14 NOTE — ANESTHESIA PROCEDURE NOTES
Peripheral Nerve Block Procedure Note    Staff:     Anesthesiologist:  WILLIAM STALLWORTH    Resident/CRNA:  HARSHAL FORREST    Block performed by resident/CRNA in the presence of a teaching physician    Location: Pre-op  Procedure Start/Stop TImes:      1/3/2018 7:10 AM     1/3/2018 7:20 AM    patient identified, IV checked, site marked, risks and benefits discussed, informed consent, monitors and equipment checked, pre-op evaluation, at physician/surgeon's request and post-op pain management      Correct Patient: Yes      Correct Position: Yes      Correct Site: Yes      Correct Procedure: Yes      Correct Laterality:  Yes    Site Marked:  Yes  Procedure details:     Procedure:  TAP        Procedure details:     Procedure:  TAP    Diagnosis:  Post-op analgesia    Laterality:  Bilateral    Position:  Supine    Sterile Prep: chloraprep, mask and sterile gloves      Local skin infiltration:  None    Needle:  Pajunk    Needle gauge:  21    Needle length (mm):  110    Ultrasound: Yes      Ultrasound used to identify targeted nerve, plexus, or vascular structure and placed a needle adjacent to it      Permanent Image entered into patiient's record      Abnormal pain on injection: No      Blood Aspirated: No      Paresthesias:  No    Bleeding at site: No      Bolus via:  Needle    Infusion Method:  Single Shot    Complications:  None  Assessment/Narrative:      10mL 0.25% bupivacaine with 1:200k epi and 10mL Exparel diluted in 10mL NS injected into each site           [FreeTextEntry1] : reviewed last labs

## (undated) DEVICE — WIPE PREMOIST CLEANSING WASHCLOTHS 7988

## (undated) DEVICE — DRSG STERI STRIP 1/2X4" R1547

## (undated) DEVICE — SUCTION MANIFOLD DORNOCH ULTRA CART UL-CL500

## (undated) DEVICE — SU MONOCRYL 3-0 PS-1 27" Y936H

## (undated) DEVICE — GLOVE PROTEXIS MICRO 7.5  2D73PM75

## (undated) DEVICE — ESU GROUND PAD ADULT W/CORD E7507

## (undated) DEVICE — ESU LIGASURE IMPACT OPEN SEALER/DVDR CVD LG JAW LF4418

## (undated) DEVICE — SU VICRYL 3-0 SH 27" UND J416H

## (undated) DEVICE — DRSG TELFA 3X8" 1238

## (undated) DEVICE — SOL WATER IRRIG 1000ML BOTTLE 2F7114

## (undated) DEVICE — SU VICRYL 0 CT-1 36" J346H

## (undated) DEVICE — WIPES FOLEY CARE SURESTEP PROVON DFC100

## (undated) DEVICE — SPONGE KITTNER 30-101

## (undated) DEVICE — CLIP HORIZON LG ORANGE 004200

## (undated) DEVICE — SU PDS II 0 TP-1 60" Z991G

## (undated) DEVICE — SU VICRYL 0 CT-1 CR 8X27" UND JJ41G

## (undated) DEVICE — PREP TECHNI-CARE CHLOROXYLENOL 3% 4OZ BOTTLE C222-4ZWO

## (undated) DEVICE — SOL ADH LIQUID BENZOIN SWAB 0.6ML C1544

## (undated) DEVICE — CLIP HORIZON SM RED WIDE SLOT 001201

## (undated) DEVICE — GLOVE PROTEXIS BLUE W/NEU-THERA 8.0  2D73EB80

## (undated) DEVICE — PREP CHLORAPREP 26ML TINTED ORANGE  260815

## (undated) DEVICE — LINEN TOWEL PACK X30 5481

## (undated) DEVICE — PACK AB HYST II

## (undated) DEVICE — SU VICRYL 0 TIE 54" J608H

## (undated) DEVICE — SOL NACL 0.9% IRRIG 1000ML BOTTLE 2F7124

## (undated) DEVICE — DRAPE LEGGINGS CLEAR 8430

## (undated) DEVICE — LINEN TOWEL PACK X6 WHITE 5487

## (undated) RX ORDER — OXYCODONE HYDROCHLORIDE 5 MG/1
TABLET ORAL
Status: DISPENSED
Start: 2018-01-03

## (undated) RX ORDER — FENTANYL CITRATE 50 UG/ML
INJECTION, SOLUTION INTRAMUSCULAR; INTRAVENOUS
Status: DISPENSED
Start: 2018-01-03

## (undated) RX ORDER — OXYCODONE HYDROCHLORIDE 5 MG/1
TABLET ORAL
Status: DISPENSED
Start: 2018-01-04

## (undated) RX ORDER — ACETAMINOPHEN 325 MG/1
TABLET ORAL
Status: DISPENSED
Start: 2018-01-04

## (undated) RX ORDER — HEPARIN SODIUM 1000 [USP'U]/ML
INJECTION, SOLUTION INTRAVENOUS; SUBCUTANEOUS
Status: DISPENSED
Start: 2018-01-03

## (undated) RX ORDER — SODIUM CHLORIDE, SODIUM LACTATE, POTASSIUM CHLORIDE, CALCIUM CHLORIDE 600; 310; 30; 20 MG/100ML; MG/100ML; MG/100ML; MG/100ML
INJECTION, SOLUTION INTRAVENOUS
Status: DISPENSED
Start: 2018-01-03

## (undated) RX ORDER — FUROSEMIDE 10 MG/ML
INJECTION INTRAMUSCULAR; INTRAVENOUS
Status: DISPENSED
Start: 2018-01-04

## (undated) RX ORDER — ONDANSETRON 2 MG/ML
INJECTION INTRAMUSCULAR; INTRAVENOUS
Status: DISPENSED
Start: 2018-01-03

## (undated) RX ORDER — CEFAZOLIN SODIUM 2 G/100ML
INJECTION, SOLUTION INTRAVENOUS
Status: DISPENSED
Start: 2018-01-03

## (undated) RX ORDER — ACETAMINOPHEN 325 MG/1
TABLET ORAL
Status: DISPENSED
Start: 2018-01-03

## (undated) RX ORDER — AMOXICILLIN 250 MG
CAPSULE ORAL
Status: DISPENSED
Start: 2018-01-04

## (undated) RX ORDER — AMOXICILLIN 250 MG
CAPSULE ORAL
Status: DISPENSED
Start: 2018-01-03

## (undated) RX ORDER — DEXAMETHASONE SODIUM PHOSPHATE 4 MG/ML
INJECTION, SOLUTION INTRA-ARTICULAR; INTRALESIONAL; INTRAMUSCULAR; INTRAVENOUS; SOFT TISSUE
Status: DISPENSED
Start: 2018-01-03